# Patient Record
Sex: MALE | Race: WHITE | NOT HISPANIC OR LATINO | URBAN - METROPOLITAN AREA
[De-identification: names, ages, dates, MRNs, and addresses within clinical notes are randomized per-mention and may not be internally consistent; named-entity substitution may affect disease eponyms.]

---

## 2018-02-12 ENCOUNTER — INPATIENT (INPATIENT)
Facility: HOSPITAL | Age: 68
LOS: 11 days | Discharge: HOME | DRG: 133 | End: 2018-02-24
Attending: OTOLARYNGOLOGY | Admitting: OTOLARYNGOLOGY
Payer: MEDICARE

## 2018-02-12 ENCOUNTER — EMERGENCY (EMERGENCY)
Facility: HOSPITAL | Age: 68
LOS: 1 days | Discharge: HOME | End: 2018-02-12
Attending: EMERGENCY MEDICINE | Admitting: EMERGENCY MEDICINE
Payer: MEDICARE

## 2018-02-12 VITALS
DIASTOLIC BLOOD PRESSURE: 83 MMHG | HEART RATE: 83 BPM | RESPIRATION RATE: 18 BRPM | OXYGEN SATURATION: 98 % | SYSTOLIC BLOOD PRESSURE: 128 MMHG

## 2018-02-12 VITALS
TEMPERATURE: 99 F | RESPIRATION RATE: 16 BRPM | DIASTOLIC BLOOD PRESSURE: 84 MMHG | HEART RATE: 90 BPM | SYSTOLIC BLOOD PRESSURE: 158 MMHG | OXYGEN SATURATION: 97 %

## 2018-02-12 VITALS
TEMPERATURE: 97 F | RESPIRATION RATE: 17 BRPM | DIASTOLIC BLOOD PRESSURE: 70 MMHG | HEART RATE: 89 BPM | SYSTOLIC BLOOD PRESSURE: 103 MMHG | OXYGEN SATURATION: 100 % | WEIGHT: 184.97 LBS

## 2018-02-12 DIAGNOSIS — R68.84 JAW PAIN: ICD-10-CM

## 2018-02-12 DIAGNOSIS — F17.210 NICOTINE DEPENDENCE, CIGARETTES, UNCOMPLICATED: ICD-10-CM

## 2018-02-12 DIAGNOSIS — I10 ESSENTIAL (PRIMARY) HYPERTENSION: ICD-10-CM

## 2018-02-12 DIAGNOSIS — K12.2 CELLULITIS AND ABSCESS OF MOUTH: ICD-10-CM

## 2018-02-12 LAB
ALBUMIN SERPL ELPH-MCNC: 3.4 G/DL — SIGNIFICANT CHANGE UP (ref 3.4–5)
ALP SERPL-CCNC: 71 U/L — SIGNIFICANT CHANGE UP (ref 40–120)
ALT FLD-CCNC: 18 U/L — SIGNIFICANT CHANGE UP (ref 12–42)
AMYLASE P1 CFR SERPL: 50 U/L — SIGNIFICANT CHANGE UP (ref 25–115)
ANION GAP SERPL CALC-SCNC: 9 MMOL/L — SIGNIFICANT CHANGE UP (ref 9–16)
APTT BLD: 32 SEC — SIGNIFICANT CHANGE UP (ref 27.5–36.5)
AST SERPL-CCNC: 22 U/L — SIGNIFICANT CHANGE UP (ref 15–37)
BASOPHILS NFR BLD AUTO: 0.3 % — SIGNIFICANT CHANGE UP (ref 0–2)
BILIRUB SERPL-MCNC: 1.2 MG/DL — SIGNIFICANT CHANGE UP (ref 0.2–1.2)
BUN SERPL-MCNC: 24 MG/DL — HIGH (ref 7–23)
CALCIUM SERPL-MCNC: 10.2 MG/DL — SIGNIFICANT CHANGE UP (ref 8.5–10.5)
CHLORIDE SERPL-SCNC: 97 MMOL/L — SIGNIFICANT CHANGE UP (ref 96–108)
CO2 SERPL-SCNC: 28 MMOL/L — SIGNIFICANT CHANGE UP (ref 22–31)
CREAT SERPL-MCNC: 1.24 MG/DL — SIGNIFICANT CHANGE UP (ref 0.5–1.3)
CRP SERPL-MCNC: >12 MG/DL — HIGH (ref 0–0.9)
EOSINOPHIL NFR BLD AUTO: 0.1 % — SIGNIFICANT CHANGE UP (ref 0–6)
GLUCOSE SERPL-MCNC: 118 MG/DL — HIGH (ref 70–99)
HCT VFR BLD CALC: 44.9 % — SIGNIFICANT CHANGE UP (ref 39–50)
HGB BLD-MCNC: 15.5 G/DL — SIGNIFICANT CHANGE UP (ref 13–17)
IMM GRANULOCYTES NFR BLD AUTO: 0.9 % — SIGNIFICANT CHANGE UP (ref 0–1.5)
INR BLD: 1.21 — HIGH (ref 0.88–1.16)
LACTATE SERPL-SCNC: 1.1 MMOL/L — SIGNIFICANT CHANGE UP (ref 0.4–2)
LYMPHOCYTES # BLD AUTO: 4.1 % — LOW (ref 13–44)
MCHC RBC-ENTMCNC: 32.9 PG — SIGNIFICANT CHANGE UP (ref 27–34)
MCHC RBC-ENTMCNC: 34.5 G/DL — SIGNIFICANT CHANGE UP (ref 32–36)
MCV RBC AUTO: 95.3 FL — SIGNIFICANT CHANGE UP (ref 80–100)
MONOCYTES NFR BLD AUTO: 9.2 % — SIGNIFICANT CHANGE UP (ref 2–14)
NEUTROPHILS NFR BLD AUTO: 85.4 % — HIGH (ref 43–77)
PLATELET # BLD AUTO: 236 K/UL — SIGNIFICANT CHANGE UP (ref 150–400)
POTASSIUM SERPL-MCNC: 3.3 MMOL/L — LOW (ref 3.5–5.3)
POTASSIUM SERPL-SCNC: 3.3 MMOL/L — LOW (ref 3.5–5.3)
PROT SERPL-MCNC: 8.8 G/DL — HIGH (ref 6.4–8.2)
PROTHROM AB SERPL-ACNC: 13.4 SEC — HIGH (ref 9.8–12.7)
RBC # BLD: 4.71 M/UL — SIGNIFICANT CHANGE UP (ref 4.2–5.8)
RBC # FLD: 12.3 % — SIGNIFICANT CHANGE UP (ref 10.3–16.9)
SODIUM SERPL-SCNC: 134 MMOL/L — SIGNIFICANT CHANGE UP (ref 132–145)
WBC # BLD: 26.3 K/UL — HIGH (ref 3.8–10.5)
WBC # FLD AUTO: 26.3 K/UL — HIGH (ref 3.8–10.5)

## 2018-02-12 PROCEDURE — 99285 EMERGENCY DEPT VISIT HI MDM: CPT

## 2018-02-12 PROCEDURE — 93010 ELECTROCARDIOGRAM REPORT: CPT

## 2018-02-12 PROCEDURE — 99285 EMERGENCY DEPT VISIT HI MDM: CPT | Mod: 25

## 2018-02-12 PROCEDURE — 99222 1ST HOSP IP/OBS MODERATE 55: CPT

## 2018-02-12 PROCEDURE — 71045 X-RAY EXAM CHEST 1 VIEW: CPT | Mod: 26

## 2018-02-12 PROCEDURE — 70491 CT SOFT TISSUE NECK W/DYE: CPT | Mod: 26

## 2018-02-12 PROCEDURE — 99232 SBSQ HOSP IP/OBS MODERATE 35: CPT

## 2018-02-12 RX ORDER — ACETAMINOPHEN 500 MG
650 TABLET ORAL EVERY 6 HOURS
Qty: 0 | Refills: 0 | Status: DISCONTINUED | OUTPATIENT
Start: 2018-02-12 | End: 2018-02-21

## 2018-02-12 RX ORDER — INFLUENZA VIRUS VACCINE 15; 15; 15; 15 UG/.5ML; UG/.5ML; UG/.5ML; UG/.5ML
0.5 SUSPENSION INTRAMUSCULAR ONCE
Qty: 0 | Refills: 0 | Status: COMPLETED | OUTPATIENT
Start: 2018-02-12 | End: 2018-02-12

## 2018-02-12 RX ORDER — ONDANSETRON 8 MG/1
4 TABLET, FILM COATED ORAL EVERY 6 HOURS
Qty: 0 | Refills: 0 | Status: DISCONTINUED | OUTPATIENT
Start: 2018-02-12 | End: 2018-02-21

## 2018-02-12 RX ORDER — DEXAMETHASONE 0.5 MG/5ML
10 ELIXIR ORAL ONCE
Qty: 0 | Refills: 0 | Status: COMPLETED | OUTPATIENT
Start: 2018-02-12 | End: 2018-02-12

## 2018-02-12 RX ORDER — VANCOMYCIN HCL 1 G
1500 VIAL (EA) INTRAVENOUS ONCE
Qty: 0 | Refills: 0 | Status: COMPLETED | OUTPATIENT
Start: 2018-02-12 | End: 2018-02-12

## 2018-02-12 RX ORDER — AMPICILLIN SODIUM AND SULBACTAM SODIUM 250; 125 MG/ML; MG/ML
3 INJECTION, POWDER, FOR SUSPENSION INTRAMUSCULAR; INTRAVENOUS EVERY 6 HOURS
Qty: 0 | Refills: 0 | Status: DISCONTINUED | OUTPATIENT
Start: 2018-02-12 | End: 2018-02-12

## 2018-02-12 RX ORDER — CHLORHEXIDINE GLUCONATE 213 G/1000ML
15 SOLUTION TOPICAL THREE TIMES A DAY
Qty: 0 | Refills: 0 | Status: DISCONTINUED | OUTPATIENT
Start: 2018-02-12 | End: 2018-02-21

## 2018-02-12 RX ORDER — AMPICILLIN SODIUM AND SULBACTAM SODIUM 250; 125 MG/ML; MG/ML
1.5 INJECTION, POWDER, FOR SUSPENSION INTRAMUSCULAR; INTRAVENOUS EVERY 6 HOURS
Qty: 0 | Refills: 0 | Status: DISCONTINUED | OUTPATIENT
Start: 2018-02-12 | End: 2018-02-14

## 2018-02-12 RX ORDER — IBUPROFEN 200 MG
400 TABLET ORAL EVERY 6 HOURS
Qty: 0 | Refills: 0 | Status: DISCONTINUED | OUTPATIENT
Start: 2018-02-12 | End: 2018-02-14

## 2018-02-12 RX ORDER — SODIUM CHLORIDE 9 MG/ML
1000 INJECTION INTRAMUSCULAR; INTRAVENOUS; SUBCUTANEOUS ONCE
Qty: 0 | Refills: 0 | Status: COMPLETED | OUTPATIENT
Start: 2018-02-12 | End: 2018-02-12

## 2018-02-12 RX ORDER — AMPICILLIN SODIUM AND SULBACTAM SODIUM 250; 125 MG/ML; MG/ML
1.5 INJECTION, POWDER, FOR SUSPENSION INTRAMUSCULAR; INTRAVENOUS ONCE
Qty: 0 | Refills: 0 | Status: COMPLETED | OUTPATIENT
Start: 2018-02-12 | End: 2018-02-12

## 2018-02-12 RX ORDER — SODIUM CHLORIDE 9 MG/ML
1000 INJECTION, SOLUTION INTRAVENOUS
Qty: 0 | Refills: 0 | Status: DISCONTINUED | OUTPATIENT
Start: 2018-02-12 | End: 2018-02-16

## 2018-02-12 RX ORDER — OXYCODONE AND ACETAMINOPHEN 5; 325 MG/1; MG/1
1 TABLET ORAL EVERY 4 HOURS
Qty: 0 | Refills: 0 | Status: DISCONTINUED | OUTPATIENT
Start: 2018-02-12 | End: 2018-02-14

## 2018-02-12 RX ADMIN — Medication 100 MILLIGRAM(S): at 12:18

## 2018-02-12 RX ADMIN — CHLORHEXIDINE GLUCONATE 15 MILLILITER(S): 213 SOLUTION TOPICAL at 22:36

## 2018-02-12 RX ADMIN — Medication 300 MILLIGRAM(S): at 13:47

## 2018-02-12 RX ADMIN — SODIUM CHLORIDE 1000 MILLILITER(S): 9 INJECTION INTRAMUSCULAR; INTRAVENOUS; SUBCUTANEOUS at 19:34

## 2018-02-12 RX ADMIN — Medication 10 MILLIGRAM(S): at 12:18

## 2018-02-12 RX ADMIN — AMPICILLIN SODIUM AND SULBACTAM SODIUM 100 GRAM(S): 250; 125 INJECTION, POWDER, FOR SUSPENSION INTRAMUSCULAR; INTRAVENOUS at 19:34

## 2018-02-12 RX ADMIN — SODIUM CHLORIDE 1000 MILLILITER(S): 9 INJECTION, SOLUTION INTRAVENOUS at 13:11

## 2018-02-12 NOTE — ED PROVIDER NOTE - NS ED ROS FT
Gen - no fever, chills, malaise, weight loss, generalized weakness   Skin - no rash, pruritus, flushing   HEENT - no HA, change in vision/hearing, tinnitus, neck pain, stiffness. +throat pain, +hoarseness  CV - no CP, palpitations, diaphoresis, orthopnea  Resp - no SOB, wheezing, cough, hemoptysis   GI - no N/V/D/C, abdominal pain, bloating, hematochezia, melena   - no dysuria, hematuria, flank pain, change in urinary/bowel function   MS - no stiffness, weakness, arthralgia, or myalgia   Hem/Onc - no abnormal bleeding or bruising   Neuro - no dizziness, numbness, tingling, paresthesia, focal weakness, diplopia, or gait disturbance

## 2018-02-12 NOTE — H&P ADULT - ATTENDING COMMENTS
Mr. Marcum has left neck abscess and high white count.  Pus aspirated from abscess pocket.  No airway or swallowing compromise  Pt examined in ED,  and Ct was reviewed.  Agree with plan for IV antibiotics and reaspiration next morning.

## 2018-02-12 NOTE — ED ADULT TRIAGE NOTE - OTHER COMPLAINTS
Patient noted to have swelling to chin and neck. Patient transferred from [facility] for r/o Juan's angina. Speaking in full sentences.

## 2018-02-12 NOTE — ED PROVIDER NOTE - ATTENDING CONTRIBUTION TO CARE
66 yo male with three days of worsening neck pain.  HTN hx on lisinopril (no evidence of angioedema).  On clinical exam - non toxic, no drooling, slightly hoarse voice.  oropharynx normal.  Firm tender submental area with surround erythema.  No trismus.  Likely Juan Angina.  no hypoxia or tachypnea.  Blood work and imaging reviewed.  Will need ENT evaluation at [facility] - ED to ED transfer.  Patient continues to appear non toxic.  Consented for transfer

## 2018-02-12 NOTE — ED PROVIDER NOTE - PHYSICAL EXAMINATION
gen: no acute distress, comfortable, conversant  HEENT: oropharynx clear no exudate, no uvular deviation, neck fullness anterior bl, erythema, + brawny edema, no trismus, no drooling, no resp distress, + hoarse voice  Neck: supple, no meningismus, no bruit noted bl carotid speaking in full sentences  CV: rrr no m/r/g 2+ radial pulse  Resp: ctab, no w/c/r  Abd: nontender, no rebound/guarding  Ext: no edema, pedal pulses 2+  Neuro: alert and oriented, cn grossly intact, strength equal in all 4 ext,

## 2018-02-12 NOTE — ED PROVIDER NOTE - OBJECTIVE STATEMENT
67M with glaucoma, HTN presenting from [facility] with submandibular abscesses, neck swelling. + hoarseness, no trismus currently although pt reported trismus before. No neck stiffness, no fever, no drooling, able to chuck PO fluids.

## 2018-02-12 NOTE — ED PROVIDER NOTE - MEDICAL DECISION MAKING DETAILS
68M with submandibular abscesses, concern for Juan's angina but vital signs wnl, no drooling, no resp distress, no trismus, airway is intact however pt's voice is hoarse. Given IV abx, wbc count elevated, appreciate ENT consult. Plan for I&D and will admit to ENT.

## 2018-02-12 NOTE — ED PROVIDER NOTE - DIAGNOSTIC INTERPRETATION
Xray (wet reads) interpreted by DONALD AGUSTIN   CXR - Cardiac silhouette, aortic knob, mediastinal and hilar contours appear wnl, no acute consolidation, infiltrate, effusion, or PTX. No bony abnormalities noted

## 2018-02-12 NOTE — ED PROVIDER NOTE - MEDICAL DECISION MAKING DETAILS
pt with 3d of jaw and neck pain, woke up with moderate swelling and redness to the region, afebrile, no recent dental procedure noted, no changes in meds, airway patent and tolerating po, sx suggestive of geo's syndrome, labs and CT performed, leukocytosis to 26K, markedly elevated CRP, CT with loculated collections b/l, L>R, ENT fellow notified and case discussed with Dr. Cortez, rec transfer to [facility] ER for further eval and tx, sign out provided to [facility] PIC Dr. Byrne

## 2018-02-12 NOTE — ED ADULT NURSE NOTE - OBJECTIVE STATEMENT
Pt brought Pt brought to ED as transfer from [facility] to rule out Juan's angina. Pt with chin/neck swelling x3 days, pt reports improvement of swelling s/p receiving meds at [facility]. Voice changes noted, pt denies difficulty breathing, no SOB noted. Pt received 600mg clindamycin, 1500mg vancomycin and 10mg decadron. Denies allergies, recent travel, fever/chills, cough/CP/nvd. VSS. Will reassess.

## 2018-02-12 NOTE — ED ADULT TRIAGE NOTE - CHIEF COMPLAINT QUOTE
here for swelling to throat x 3 days- unable to visualize uvula- c/o difficulty swallowing and hoarseness to voice

## 2018-02-12 NOTE — ED ADULT NURSE NOTE - OBJECTIVE STATEMENT
Pt c/o upper neck pain x3days with swelling and redness that began today. Pt reports some difficulty swallowing and throat pain, denies any difficulty breathing. No cough, no CP/SOB, no fever/chills, no N/V/D. Pt was clinically upgraded, placed on droplet precautions to r/o mumps.

## 2018-02-12 NOTE — ED PROVIDER NOTE - PHYSICAL EXAMINATION
Gen - NAD, comfortable in stretcher, non-toxic appearing, speaking in full sentences   Skin - warm, dry, intact  HEENT - AT/NC, PERRL, EOMI, no conjunctival injection, o/p clear with no erythema, edema, or exudate, uvula midline, airway patent. Moderate amount of erythema, edema, starting from the base of submandibular region bilaterally extending to anterior neck and chest wall with slight palpable firmness and tenderness. No fluctuance, trismus, or drooling. Uvula midline. No weeping.   CV - S1S2, R/R/R  Resp - respiration non-labored, CTAB, symmetric bs b/l, no r/r/w  GI - NABS, soft, ND, NT, no rebound or guarding, no CVAT b/l   MS - w/w/p, no c/c/e, calves supple and NT, distal pulses symmetric b/l   Neuro - AxOx3, no focal neuro deficits, CN II-XII grossly intact, cerebellar function intact, ambulatory without gait disturbance Gen - WDWN M, NAD, comfortable in stretcher, non-toxic appearing, speaking in full sentences   Skin - warm, dry, intact  HEENT - AT/NC, PERRL, EOMI, no conjunctival injection, o/p clear with no erythema, edema, or exudate, uvula midline, airway patent. Moderate amount of erythema, edema, starting from the base of submandibular region bilaterally extending to anterior neck and chest wall with slight palpable firmness and tenderness. No fluctuance, trismus, or drooling. Uvula midline. No weeping.   CV - S1S2, R/R/R  Resp - respiration non-labored, CTAB, symmetric bs b/l, no r/r/w  GI - NABS, soft, ND, NT, no rebound or guarding, no CVAT b/l   MS - w/w/p, no c/c/e, calves supple and NT, distal pulses symmetric b/l   Neuro - AxOx3, no focal neuro deficits, CN II-XII grossly intact, cerebellar function intact, ambulatory without gait disturbance

## 2018-02-12 NOTE — ED ADULT NURSE NOTE - OTHER COMPLAINTS
Patient noted to have swelling to chin and neck. Patient transferred from [facility] for r/o Juan's angina. Speaking in full sentences.

## 2018-02-12 NOTE — H&P ADULT - HISTORY OF PRESENT ILLNESS
[facility] H&P    HPI: 67yoM PMHx HTN and glaucoma p/w several days of worsening L>R submanibular/submental swelling and erythema. First noticed swelling which has gotten progressively worse with new onset voice change today which prompted him to go to ED. Patient denies inciting event, trauma or injury or defect in skin. Denies recent URI or viral sx. Denies hx sialadenitis or sialolithiasis. Denies hx DM, immunosuppression. CT neck in [facility] ED showing intramuscular anterior/posterior belly of digastric abscess so patient transferred to [facility] ED for further evaluation. Denies dysphagia, odynophagia, dyspnea, f/c/s, n/v. Tolerating secretions.     Allergies  No Known Allergies  Intolerances        PAST MEDICAL & SURGICAL HISTORY:  Glaucoma  HTN (hypertension)  No significant past surgical history      SOCIAL HISTORY:  Tobacco History: current smoker, 4cig/day (previous PPD smoker)  ETOH Use: social alcohol 2x/week      REVIEW OF SYSTEMS: as per HPI    Endocrine:	      MEDICATIONS:  Antiinfectives:       Hematologic/Anticoagulation:      Pain medications/Neuro:      IV fluids:      Endocrine Medications:       All other standing medications:       All other PRN medications:      Vital Signs Last 24 Hrs  T(C): 37.2 (12 Feb 2018 18:00), Max: 37.4 (12 Feb 2018 16:02)  T(F): 99 (12 Feb 2018 18:00), Max: 99.4 (12 Feb 2018 16:02)  HR: 87 (12 Feb 2018 18:00) (70 - 90)  BP: 111/75 (12 Feb 2018 18:00) (103/70 - 158/84)  BP(mean): --  RR: 17 (12 Feb 2018 18:00) (16 - 18)  SpO2: 97% (12 Feb 2018 18:00) (97% - 100%)    LABS:  CBC-    02-12    134  |  97  |  24<H>  ----------------------------<  118<H>  3.3<L>   |  28  |  1.24    Ca    10.2      12 Feb 2018 12:36    TPro  8.8<H>  /  Alb  3.4  /  TBili  1.2  /  DBili  x   /  AST  22  /  ALT  18  /  AlkPhos  71  02-12    Coagulation Studies-  PT/INR - ( 12 Feb 2018 12:36 )   PT: 13.4 sec;   INR: 1.21          PTT - ( 12 Feb 2018 12:36 )  PTT:32.0 sec  Endocrine Panel-  --  --  10.2 mg/dL        PHYSICAL EXAM:    ENT EXAM-   Constitutional: Well-developed, well-nourished. Nontoxic appearing   voice slightly muffled   Head:  normocephalic, atraumatic.   OC/OP:  Tonsils present 1+, Floor of mouth, buccal mucosa, lips, hard palate, soft palate, uvula, posterior pharyngeal wall normal.  Mucosa moist. no mucosal lesions  saliva from b/l elkin's ducts, no induration or fluctuance on bimanual palpation, no edema   Neck with L>R swelling and induration with erythema, moderate ttp   Lymph:  No palpable cervical adenopathy, L 1cm soft mobile periparotid lymph node at angle of mandible     MULTISYSTEM EXAM-  Neuro/Psych:  A&O x 3.  Mood stable.  Affect bright.  Cranial nerves: 2-12 grossly intact bilaterally.  Eyes:  EOMI, no nystagmus.  Pulm:  No dyspnea, non-labored breathing, tolerating secretions     LARYNGOSCOPY EXAM:     -Verbal consent was obtained from patient prior to procedure.  Indication: hoarseness   Anesthesia: Afrin spray was applied to the nasal cavities.    Flexible laryngoscopy was performed and revealed the following:    -- Nasopharynx had no mass or exudate.    -- Base of tongue was symmetric and not enlarged.    -- Vallecula was clear    -- Left Lingual surface of epiglottis with mild watery edema extending down left AE fold and arytenoid, minimal involvement of false cords    -- True vocal folds uninvolved, were fully mobile and without lesions.     -- Post cricoid area was clear. piriforms were clear    -- Interarytenoid edema was absent.      -- Airway widely patent     The patient tolerated the procedure well.      RADIOLOGY & ADDITIONAL STUDIES:  CT neck w/ IV contrast: Findings:  There is central fluid density within the expanded left digastric   anterior and posterior bellies, extending from the left submental region   to the lateral skull base. There is surrounding inflammation with   reticulation of adjacent fat planes as well as skin and platysma   thickening. Findings are compatible with intramuscular abscess formation.   Etiology is not certain, as there is no obvious site of active   odontogenic infection and mild enlargement of the left submandibular   gland is felt to more likely reflect secondary sialoadenitis.  *  Aerodigestive Tract: Edema extends into the left parapharyngeal space   with presumed reactive epiglottic and left aryepiglottic fold thickening   and infiltration of the preepiglottic fat. There is no evidence of airway   compromise.  *  Lymph Nodes: Mild reactive appearing lymph node enlargement is noted   in left greater than right levels 1 through 3.  *  Salivary Glands: Asymmetric enlargement and enhancement of the left   submandibular gland without intraglandular calculus or calculus along the   expected course of left Warthin's duct. Findings are felt more likely to   reflect reactive sialoadenitis then primary source of infection.  *  Thyroid Gland: Subcentimeter right lobe cyst.  *  Orbits: Native ocular lenses are absent bilaterally. Orbital contents   are otherwise normal.  *  Brain: Visualized portions are grossly normal.  *  Skull Base: Intact.  *  Paranasal Sinuses: Scattered foci of mucosal thickening and polypoid   soft tissue.  *  Mastoids: Clear bilaterally.  *  Cervical Spine: Normal vertebral height and alignment with minor   degenerative change.  *  Lung Apices: No large apical lung mass.    IMPRESSION:  Intramuscular abscess involving both anterior and posterior bellies of   the left digastric muscle, as above. No obvious odontogenic or salivary   source is identified on this CT study.      Procedure: All r/b/a discussed and verbal consent obtained. 2cc lidocaine 2% with 1:100,000 epinephrine infiltrated into subcutaneous tissue for local anesthesia. Alcohol prep pad used to clean skin. 18G needle used to aspirate ~1.5cc of purulent material. Culture sent. Left periparotid lymph node aspirated without any purulent material. patient tolerated procedure well.       Assessment/Plan:  67y Male with left soft tissue vs. intramuscular abscess s/p needle aspiration 2/12    -Admit to ENT  -Unasyn for abx tx  -f/u culture  -regular diet  -restart home meds  -warm compresses   -nicotine patch   -up ad mary  -IS  -DVT ppx: SCDs, ambulation'  -[facility] at [phone] with any questions/concerns.    Dispo: regional room    seen with attending who agrees with plan above

## 2018-02-12 NOTE — ED PROVIDER NOTE - OBJECTIVE STATEMENT
67 year old male w/ PMH HTN on lisinopril for over 5 years, NKDA, smokes 4 cigarettes daily, who presents to the ED w/ 3 days of pain in bilateral jaws, gradual onset, progressively worsening waking up this morning with swelling to bilateral jaw, hoarseness and change in voice, so patient presents for evaluation. No recent procedure or dental work done. No change in medications. No new products/medications. Denies fever, chills, drooling, stridors, change in phonation, SOB, wheezing, cough, congestion, HA, dizziness, N/V/D/C, abdominal pain, rash, neck pain, focal weakness, numbness, tingling, and malaise 67 year old male w/ PMH HTN on lisinopril for over 5 years, NKDA, smokes 4 cigarettes daily, who presents to the ED w/ 3 days of pain in bilateral jaws, gradual onset, progressively worsening waking up this morning with swelling to bilateral jaw, hoarseness and change in voice, so patient presents for evaluation. No recent procedure or dental work done. No change in medications. No new products/medications. Denies fever, chills, drooling, stridors, change in phonation, SOB, wheezing, cough, congestion, HA, dizziness, N/V/D/C, abdominal pain, rash, neck pain, focal weakness, numbness, tingling, and malaise No h/o DM or HIV. 67 year old male w/ PMH HTN on lisinopril for over 5 years, NKDA, smokes 4 cigarettes daily, who presents to the ED w/ 3 days of pain in bilateral jaws, gradual onset, progressively worsening waking up this morning with swelling to bilateral jaw, hoarseness and change in voice, so patient presents for evaluation. No recent procedure or dental work done. No change in medications. No new products/medications. Denies fever, chills, drooling, stridors, change in phonation, SOB, wheezing, cough, congestion, HA, dizziness, N/V/D/C, abdominal pain, rash, neck pain, focal weakness, numbness, tingling, and malaise No h/o DM or HIV

## 2018-02-13 LAB
ANION GAP SERPL CALC-SCNC: 16 MMOL/L — SIGNIFICANT CHANGE UP (ref 5–17)
BUN SERPL-MCNC: 16 MG/DL — SIGNIFICANT CHANGE UP (ref 7–23)
CALCIUM SERPL-MCNC: 9.2 MG/DL — SIGNIFICANT CHANGE UP (ref 8.4–10.5)
CHLORIDE SERPL-SCNC: 96 MMOL/L — SIGNIFICANT CHANGE UP (ref 96–108)
CO2 SERPL-SCNC: 26 MMOL/L — SIGNIFICANT CHANGE UP (ref 22–31)
CREAT SERPL-MCNC: 0.73 MG/DL — SIGNIFICANT CHANGE UP (ref 0.5–1.3)
GLUCOSE SERPL-MCNC: 146 MG/DL — HIGH (ref 70–99)
GRAM STN FLD: SIGNIFICANT CHANGE UP
HCT VFR BLD CALC: 38 % — LOW (ref 39–50)
HGB BLD-MCNC: 13.3 G/DL — SIGNIFICANT CHANGE UP (ref 13–17)
MAGNESIUM SERPL-MCNC: 1.8 MG/DL — SIGNIFICANT CHANGE UP (ref 1.6–2.6)
MCHC RBC-ENTMCNC: 32.5 PG — SIGNIFICANT CHANGE UP (ref 27–34)
MCHC RBC-ENTMCNC: 35 G/DL — SIGNIFICANT CHANGE UP (ref 32–36)
MCV RBC AUTO: 92.9 FL — SIGNIFICANT CHANGE UP (ref 80–100)
PHOSPHATE SERPL-MCNC: 3.1 MG/DL — SIGNIFICANT CHANGE UP (ref 2.5–4.5)
PLATELET # BLD AUTO: 216 K/UL — SIGNIFICANT CHANGE UP (ref 150–400)
POTASSIUM SERPL-MCNC: 3.5 MMOL/L — SIGNIFICANT CHANGE UP (ref 3.5–5.3)
POTASSIUM SERPL-SCNC: 3.5 MMOL/L — SIGNIFICANT CHANGE UP (ref 3.5–5.3)
RBC # BLD: 4.09 M/UL — LOW (ref 4.2–5.8)
RBC # FLD: 12.7 % — SIGNIFICANT CHANGE UP (ref 10.3–16.9)
SODIUM SERPL-SCNC: 138 MMOL/L — SIGNIFICANT CHANGE UP (ref 135–145)
SPECIMEN SOURCE: SIGNIFICANT CHANGE UP
WBC # BLD: 21.5 K/UL — HIGH (ref 3.8–10.5)
WBC # FLD AUTO: 21.5 K/UL — HIGH (ref 3.8–10.5)

## 2018-02-13 PROCEDURE — 99231 SBSQ HOSP IP/OBS SF/LOW 25: CPT

## 2018-02-13 RX ORDER — LISINOPRIL 2.5 MG/1
10 TABLET ORAL DAILY
Qty: 0 | Refills: 0 | Status: DISCONTINUED | OUTPATIENT
Start: 2018-02-13 | End: 2018-02-21

## 2018-02-13 RX ORDER — NICOTINE POLACRILEX 2 MG
1 GUM BUCCAL DAILY
Qty: 0 | Refills: 0 | Status: DISCONTINUED | OUTPATIENT
Start: 2018-02-13 | End: 2018-02-21

## 2018-02-13 RX ORDER — HYDROCHLOROTHIAZIDE 25 MG
25 TABLET ORAL ONCE
Qty: 0 | Refills: 0 | Status: COMPLETED | OUTPATIENT
Start: 2018-02-13 | End: 2018-02-13

## 2018-02-13 RX ORDER — MAGNESIUM OXIDE 400 MG ORAL TABLET 241.3 MG
400 TABLET ORAL ONCE
Qty: 0 | Refills: 0 | Status: COMPLETED | OUTPATIENT
Start: 2018-02-13 | End: 2018-02-13

## 2018-02-13 RX ORDER — HEPARIN SODIUM 5000 [USP'U]/ML
5000 INJECTION INTRAVENOUS; SUBCUTANEOUS EVERY 12 HOURS
Qty: 0 | Refills: 0 | Status: DISCONTINUED | OUTPATIENT
Start: 2018-02-13 | End: 2018-02-21

## 2018-02-13 RX ORDER — LISINOPRIL 2.5 MG/1
10 TABLET ORAL ONCE
Qty: 0 | Refills: 0 | Status: COMPLETED | OUTPATIENT
Start: 2018-02-13 | End: 2018-02-13

## 2018-02-13 RX ORDER — POTASSIUM CHLORIDE 20 MEQ
40 PACKET (EA) ORAL ONCE
Qty: 0 | Refills: 0 | Status: COMPLETED | OUTPATIENT
Start: 2018-02-13 | End: 2018-02-13

## 2018-02-13 RX ORDER — HYDROCHLOROTHIAZIDE 25 MG
25 TABLET ORAL DAILY
Qty: 0 | Refills: 0 | Status: DISCONTINUED | OUTPATIENT
Start: 2018-02-13 | End: 2018-02-21

## 2018-02-13 RX ORDER — TIMOLOL 0.5 %
1 DROPS OPHTHALMIC (EYE)
Qty: 0 | Refills: 0 | Status: DISCONTINUED | OUTPATIENT
Start: 2018-02-13 | End: 2018-02-21

## 2018-02-13 RX ADMIN — HEPARIN SODIUM 5000 UNIT(S): 5000 INJECTION INTRAVENOUS; SUBCUTANEOUS at 17:39

## 2018-02-13 RX ADMIN — AMPICILLIN SODIUM AND SULBACTAM SODIUM 100 GRAM(S): 250; 125 INJECTION, POWDER, FOR SUSPENSION INTRAMUSCULAR; INTRAVENOUS at 15:43

## 2018-02-13 RX ADMIN — CHLORHEXIDINE GLUCONATE 15 MILLILITER(S): 213 SOLUTION TOPICAL at 15:43

## 2018-02-13 RX ADMIN — Medication 25 MILLIGRAM(S): at 18:45

## 2018-02-13 RX ADMIN — AMPICILLIN SODIUM AND SULBACTAM SODIUM 100 GRAM(S): 250; 125 INJECTION, POWDER, FOR SUSPENSION INTRAMUSCULAR; INTRAVENOUS at 06:54

## 2018-02-13 RX ADMIN — CHLORHEXIDINE GLUCONATE 15 MILLILITER(S): 213 SOLUTION TOPICAL at 21:44

## 2018-02-13 RX ADMIN — CHLORHEXIDINE GLUCONATE 15 MILLILITER(S): 213 SOLUTION TOPICAL at 06:54

## 2018-02-13 RX ADMIN — AMPICILLIN SODIUM AND SULBACTAM SODIUM 100 GRAM(S): 250; 125 INJECTION, POWDER, FOR SUSPENSION INTRAMUSCULAR; INTRAVENOUS at 21:45

## 2018-02-13 RX ADMIN — AMPICILLIN SODIUM AND SULBACTAM SODIUM 100 GRAM(S): 250; 125 INJECTION, POWDER, FOR SUSPENSION INTRAMUSCULAR; INTRAVENOUS at 01:05

## 2018-02-13 RX ADMIN — MAGNESIUM OXIDE 400 MG ORAL TABLET 400 MILLIGRAM(S): 241.3 TABLET ORAL at 15:44

## 2018-02-13 RX ADMIN — Medication 400 MILLIGRAM(S): at 17:23

## 2018-02-13 RX ADMIN — Medication 400 MILLIGRAM(S): at 15:44

## 2018-02-13 RX ADMIN — Medication 40 MILLIEQUIVALENT(S): at 15:44

## 2018-02-13 RX ADMIN — LISINOPRIL 10 MILLIGRAM(S): 2.5 TABLET ORAL at 18:45

## 2018-02-13 NOTE — PROGRESS NOTE ADULT - SUBJECTIVE AND OBJECTIVE BOX
ENT [facility] DAILY PROGRESS NOTE    Overnight events/Interval HPI: 67y Male          Allergies    No Known Allergies    Intolerances        MEDICATIONS:  Antiinfectives:   ampicillin/sulbactam  IVPB 1.5 Gram(s) IV Intermittent every 6 hours    IV fluids:  magnesium oxide 400 milliGRAM(s) Oral once  potassium chloride    Tablet ER 40 milliEquivalent(s) Oral once    Hematologic/Anticoagulation:  heparin  Injectable 5000 Unit(s) SubCutaneous every 12 hours    Pain medications/Neuro:  acetaminophen   Tablet. 650 milliGRAM(s) Oral every 6 hours PRN  ibuprofen  Tablet 400 milliGRAM(s) Oral every 6 hours PRN  ondansetron Injectable 4 milliGRAM(s) IV Push every 6 hours PRN  oxyCODONE    5 mG/acetaminophen 325 mG 1 Tablet(s) Oral every 4 hours PRN    Endocrine Medications:     All other standing medications:   chlorhexidine 0.12% Liquid 15 milliLiter(s) Swish and Spit three times a day  influenza   Vaccine 0.5 milliLiter(s) IntraMuscular once    All other PRN medications:      Vital Signs Last 24 Hrs  T(C): 36.6 (13 Feb 2018 05:26), Max: 37.4 (12 Feb 2018 16:02)  T(F): 97.8 (13 Feb 2018 05:26), Max: 99.4 (12 Feb 2018 16:02)  HR: 69 (13 Feb 2018 05:26) (69 - 90)  BP: 117/79 (13 Feb 2018 05:26) (103/70 - 158/84)  BP(mean): --  RR: 16 (13 Feb 2018 05:26) (16 - 18)  SpO2: 98% (13 Feb 2018 05:26) (97% - 100%)      02-12 @ 07:01  -  02-13 @ 07:00  --------------------------------------------------------  IN:    Oral Fluid: 120 mL  Total IN: 120 mL    OUT:  Total OUT: 0 mL    Total NET: 120 mL            PHYSICAL EXAM:    ENT EXAM-   Constitutional: Well-developed, well-nourished.  No hoarseness.     Head:  normocephalic, atraumatic.   Ears:  Ear canals both clear.  Tympanic membranes both intact; no effusion or retraction.  Nose:  Septum intact, midline, deviated.  Inferior turbinates normal bilateral  OC/OP:  Tonsils present /  absent.  Floor of mouth, buccal mucosa, lips, hard palate, soft palate, uvula, posterior pharyngeal wall normal.  Mucosa moist.  Neck:  Trachea midline.  Thyroid, parotid and submandibular glands normal.  Lymph:  No cervical adenopathy.  Facial Plastics:     MULTISYSTEM EXAM-  Neuro/Psych:  A&O x 3.  Mood stable.  Affect bright.  Cranial nerves: 2-12 grossly intact bilaterally.  Eyes:  EOMI, no nystagmus.  Pulm:  No dyspnea, non-labored breathing  Cardiovascular: Carotid pulses 2+ bilaterally.  No periphreal edema.  Skin:  No rash or lesions on exposed skin of head/neck    LABS:  CBC-                        13.3   21.5  )-----------( 216      ( 13 Feb 2018 05:44 )             38.0     BMP/CMP-  13 Feb 2018 05:44    138    |  96     |  16     ----------------------------<  146    3.5     |  26     |  0.73     Ca    9.2        13 Feb 2018 05:44  Phos  3.1       13 Feb 2018 05:44  Mg     1.8       13 Feb 2018 05:44    TPro  8.8    /  Alb  3.4    /  TBili  1.2    /  DBili  x      /  AST  22     /  ALT  18     /  AlkPhos  71     12 Feb 2018 12:36    Coagulation Studies-  PT/INR - ( 12 Feb 2018 12:36 )   PT: 13.4 sec;   INR: 1.21          PTT - ( 12 Feb 2018 12:36 )  PTT:32.0 sec  Endocrine Panel-  Calcium, Total Serum: 9.2 mg/dL (02-13 @ 05:44)  Calcium, Total Serum: 10.2 mg/dL (02-12 @ 12:36)        Assessment/Plan:  67y Male ENT [facility] DAILY PROGRESS NOTE    Overnight events/Interval:  CAMPBELL overnight. AFVSS. Tolerating PO. Pain well controlled. Exam stable. Denies dyspnea, CP, dysphagia, n/v, f/c/s. On unasyn for abx tx. WBC downtrending.       Allergies  No Known Allergies  Intolerances      MEDICATIONS:  Antiinfectives:   ampicillin/sulbactam  IVPB 1.5 Gram(s) IV Intermittent every 6 hours    IV fluids:  magnesium oxide 400 milliGRAM(s) Oral once  potassium chloride    Tablet ER 40 milliEquivalent(s) Oral once    Hematologic/Anticoagulation:  heparin  Injectable 5000 Unit(s) SubCutaneous every 12 hours    Pain medications/Neuro:  acetaminophen   Tablet. 650 milliGRAM(s) Oral every 6 hours PRN  ibuprofen  Tablet 400 milliGRAM(s) Oral every 6 hours PRN  ondansetron Injectable 4 milliGRAM(s) IV Push every 6 hours PRN  oxyCODONE    5 mG/acetaminophen 325 mG 1 Tablet(s) Oral every 4 hours PRN    Endocrine Medications:     All other standing medications:   chlorhexidine 0.12% Liquid 15 milliLiter(s) Swish and Spit three times a day  influenza   Vaccine 0.5 milliLiter(s) IntraMuscular once    All other PRN medications:      Vital Signs Last 24 Hrs  T(C): 36.6 (13 Feb 2018 05:26), Max: 37.4 (12 Feb 2018 16:02)  T(F): 97.8 (13 Feb 2018 05:26), Max: 99.4 (12 Feb 2018 16:02)  HR: 69 (13 Feb 2018 05:26) (69 - 90)  BP: 117/79 (13 Feb 2018 05:26) (103/70 - 158/84)  BP(mean): --  RR: 16 (13 Feb 2018 05:26) (16 - 18)  SpO2: 98% (13 Feb 2018 05:26) (97% - 100%)      02-12 @ 07:01  -  02-13 @ 07:00  --------------------------------------------------------  IN:    Oral Fluid: 120 mL  Total IN: 120 mL    OUT:  Total OUT: 0 mL    Total NET: 120 mL            PHYSICAL EXAM:  NAD, alert and appropriate, pleasant  NC/AT, EOMI  nonlabored respirations on RA, no stridor/stertor, tolerating secretions  voice slightly muffled but clear  OC/Op poor dentition, saliva from Reggie's and Gentry's duct, no mucosa lesions, FOM/tongue/buccal mucosa soft, no induration or swelling.   Neck with erythema in L>R submental and submandibular regions with induration and ttp, no palpable fluctuance, severe ttp over angle of mandible with palpable periparotid LN otherwise no palpable LAD  avila ORNELAS     LABS:  CBC-                        13.3   21.5  )-----------( 216      ( 13 Feb 2018 05:44 )             38.0     BMP/CMP-  13 Feb 2018 05:44    138    |  96     |  16     ----------------------------<  146    3.5     |  26     |  0.73     Ca    9.2        13 Feb 2018 05:44  Phos  3.1       13 Feb 2018 05:44  Mg     1.8       13 Feb 2018 05:44    TPro  8.8    /  Alb  3.4    /  TBili  1.2    /  DBili  x      /  AST  22     /  ALT  18     /  AlkPhos  71     12 Feb 2018 12:36    Coagulation Studies-  PT/INR - ( 12 Feb 2018 12:36 )   PT: 13.4 sec;   INR: 1.21          PTT - ( 12 Feb 2018 12:36 )  PTT:32.0 sec  Endocrine Panel-  Calcium, Total Serum: 9.2 mg/dL (02-13 @ 05:44)  Calcium, Total Serum: 10.2 mg/dL (02-12 @ 12:36)        Assessment/Plan:  67y Male with left soft tissue vs. intramuscular abscess s/p needle aspiration 2/12    -Continue unasyn for abx tx  -f/u culture  -regular diet  -restart home meds  -warm compresses to site  -nicotine patch   -up ad mary  -IS  -DVT ppx: SCDs, SQH  -[facility] at [phone] with any questions/concerns.    Dispo: regional room    seen with chief and d/w attending who agrees with plan above ENT [facility] DAILY PROGRESS NOTE    Overnight events/Interval:  CAMPBELL overnight. AFVSS. Tolerating PO. Pain well controlled.  Denies dyspnea, CP, dysphagia, n/v, f/c/s.   On unasyn for abx tx.   WBC downtrending.       Allergies  No Known Allergies      MEDICATIONS:  Antiinfectives:   ampicillin/sulbactam  IVPB 1.5 Gram(s) IV Intermittent every 6 hours    magnesium oxide 400 milliGRAM(s) Oral once  potassium chloride    Tablet ER 40 milliEquivalent(s) Oral once    Hematologic/Anticoagulation:  heparin  Injectable 5000 Unit(s) SubCutaneous every 12 hours    Pain medications/Neuro:  acetaminophen   Tablet. 650 milliGRAM(s) Oral every 6 hours PRN  ibuprofen  Tablet 400 milliGRAM(s) Oral every 6 hours PRN  ondansetron Injectable 4 milliGRAM(s) IV Push every 6 hours PRN  oxyCODONE    5 mG/acetaminophen 325 mG 1 Tablet(s) Oral every 4 hours PRN    All other standing medications:   chlorhexidine 0.12% Liquid 15 milliLiter(s) Swish and Spit three times a day  influenza   Vaccine 0.5 milliLiter(s) IntraMuscular once      Vital Signs Last 24 Hrs  T(C): 36.6 (13 Feb 2018 05:26), Max: 37.4 (12 Feb 2018 16:02)  T(F): 97.8 (13 Feb 2018 05:26), Max: 99.4 (12 Feb 2018 16:02)  HR: 69 (13 Feb 2018 05:26) (69 - 90)  BP: 117/79 (13 Feb 2018 05:26) (103/70 - 158/84)  RR: 16 (13 Feb 2018 05:26) (16 - 18)  SpO2: 98% (13 Feb 2018 05:26) (97% - 100%)      02-12 @ 07:01  -  02-13 @ 07:00  --------------------------------------------------------  IN:    Oral Fluid: 120 mL  Total IN: 120 mL    OUT:  Total OUT: 0 mL    Total NET: 120 mL            PHYSICAL EXAM:  NAD, alert and appropriate, pleasant  NC/AT, EOMI  nonlabored respirations on RA, no stridor/stertor, tolerating secretions  voice slightly muffled but clear  OC/Op poor dentition, saliva from Reggie's and Tennyson's duct, no mucosa lesions, FOM/tongue/buccal mucosa soft, no induration or swelling.   Neck with erythema in L>R submental and submandibular regions with induration and ttp, no palpable fluctuance.  Severely tender over left angle of mandible with palpable periparotid LN; otherwise no palpable LAD  Neuro: CN 2-12 intact    LABS:  CBC-                        13.3   21.5  )-----------( 216      ( 13 Feb 2018 05:44 )             38.0     BMP/CMP-  13 Feb 2018 05:44    138    |  96     |  16     ----------------------------<  146    3.5     |  26     |  0.73     Ca    9.2        13 Feb 2018 05:44  Phos  3.1       13 Feb 2018 05:44  Mg     1.8       13 Feb 2018 05:44    TPro  8.8    /  Alb  3.4    /  TBili  1.2    /  DBili  x      /  AST  22     /  ALT  18     /  AlkPhos  71     12 Feb 2018 12:36    Coagulation Studies-  PT/INR - ( 12 Feb 2018 12:36 )   PT: 13.4 sec;   INR: 1.21       PTT - ( 12 Feb 2018 12:36 )  PTT:32.0 sec  Endocrine Panel-  Calcium, Total Serum: 9.2 mg/dL (02-13 @ 05:44)  Calcium, Total Serum: 10.2 mg/dL (02-12 @ 12:36)        Assessment/Plan:  67y Male with left soft tissue vs. intramuscular abscess submental/submandibular areas, s/p needle aspiration 2/12    -Continue unasyn for abx tx  -f/u culture  -regular diet  -restart home meds  -warm compresses to site  -nicotine patch   -up ad mary  -IS  -DVT ppx: SCDs, SQH  -[facility] at [phone] with any questions/concerns.    Dispo: regional room    seen with chief and d/w attending who agrees with plan above

## 2018-02-14 LAB
HCT VFR BLD CALC: 40.1 % — SIGNIFICANT CHANGE UP (ref 39–50)
HGB BLD-MCNC: 13.4 G/DL — SIGNIFICANT CHANGE UP (ref 13–17)
MCHC RBC-ENTMCNC: 31.4 PG — SIGNIFICANT CHANGE UP (ref 27–34)
MCHC RBC-ENTMCNC: 33.4 G/DL — SIGNIFICANT CHANGE UP (ref 32–36)
MCV RBC AUTO: 93.9 FL — SIGNIFICANT CHANGE UP (ref 80–100)
PLATELET # BLD AUTO: 234 K/UL — SIGNIFICANT CHANGE UP (ref 150–400)
RBC # BLD: 4.27 M/UL — SIGNIFICANT CHANGE UP (ref 4.2–5.8)
RBC # FLD: 12.5 % — SIGNIFICANT CHANGE UP (ref 10.3–16.9)
WBC # BLD: 15.2 K/UL — HIGH (ref 3.8–10.5)
WBC # FLD AUTO: 15.2 K/UL — HIGH (ref 3.8–10.5)

## 2018-02-14 PROCEDURE — 99232 SBSQ HOSP IP/OBS MODERATE 35: CPT

## 2018-02-14 RX ORDER — AMPICILLIN SODIUM AND SULBACTAM SODIUM 250; 125 MG/ML; MG/ML
3 INJECTION, POWDER, FOR SUSPENSION INTRAMUSCULAR; INTRAVENOUS EVERY 6 HOURS
Qty: 0 | Refills: 0 | Status: DISCONTINUED | OUTPATIENT
Start: 2018-02-14 | End: 2018-02-21

## 2018-02-14 RX ORDER — MORPHINE SULFATE 50 MG/1
4 CAPSULE, EXTENDED RELEASE ORAL ONCE
Qty: 0 | Refills: 0 | Status: DISCONTINUED | OUTPATIENT
Start: 2018-02-14 | End: 2018-02-14

## 2018-02-14 RX ORDER — IBUPROFEN 200 MG
400 TABLET ORAL EVERY 6 HOURS
Qty: 0 | Refills: 0 | Status: DISCONTINUED | OUTPATIENT
Start: 2018-02-14 | End: 2018-02-15

## 2018-02-14 RX ORDER — OXYCODONE AND ACETAMINOPHEN 5; 325 MG/1; MG/1
1 TABLET ORAL EVERY 4 HOURS
Qty: 0 | Refills: 0 | Status: DISCONTINUED | OUTPATIENT
Start: 2018-02-14 | End: 2018-02-21

## 2018-02-14 RX ORDER — OXYCODONE AND ACETAMINOPHEN 5; 325 MG/1; MG/1
2 TABLET ORAL EVERY 4 HOURS
Qty: 0 | Refills: 0 | Status: DISCONTINUED | OUTPATIENT
Start: 2018-02-14 | End: 2018-02-15

## 2018-02-14 RX ADMIN — LISINOPRIL 10 MILLIGRAM(S): 2.5 TABLET ORAL at 05:52

## 2018-02-14 RX ADMIN — CHLORHEXIDINE GLUCONATE 15 MILLILITER(S): 213 SOLUTION TOPICAL at 13:51

## 2018-02-14 RX ADMIN — OXYCODONE AND ACETAMINOPHEN 2 TABLET(S): 5; 325 TABLET ORAL at 14:30

## 2018-02-14 RX ADMIN — AMPICILLIN SODIUM AND SULBACTAM SODIUM 100 GRAM(S): 250; 125 INJECTION, POWDER, FOR SUSPENSION INTRAMUSCULAR; INTRAVENOUS at 03:42

## 2018-02-14 RX ADMIN — OXYCODONE AND ACETAMINOPHEN 1 TABLET(S): 5; 325 TABLET ORAL at 05:56

## 2018-02-14 RX ADMIN — AMPICILLIN SODIUM AND SULBACTAM SODIUM 200 GRAM(S): 250; 125 INJECTION, POWDER, FOR SUSPENSION INTRAMUSCULAR; INTRAVENOUS at 14:49

## 2018-02-14 RX ADMIN — OXYCODONE AND ACETAMINOPHEN 1 TABLET(S): 5; 325 TABLET ORAL at 10:20

## 2018-02-14 RX ADMIN — CHLORHEXIDINE GLUCONATE 15 MILLILITER(S): 213 SOLUTION TOPICAL at 21:48

## 2018-02-14 RX ADMIN — AMPICILLIN SODIUM AND SULBACTAM SODIUM 100 GRAM(S): 250; 125 INJECTION, POWDER, FOR SUSPENSION INTRAMUSCULAR; INTRAVENOUS at 09:37

## 2018-02-14 RX ADMIN — MORPHINE SULFATE 4 MILLIGRAM(S): 50 CAPSULE, EXTENDED RELEASE ORAL at 11:58

## 2018-02-14 RX ADMIN — Medication 25 MILLIGRAM(S): at 05:52

## 2018-02-14 RX ADMIN — OXYCODONE AND ACETAMINOPHEN 1 TABLET(S): 5; 325 TABLET ORAL at 09:41

## 2018-02-14 RX ADMIN — HEPARIN SODIUM 5000 UNIT(S): 5000 INJECTION INTRAVENOUS; SUBCUTANEOUS at 18:21

## 2018-02-14 RX ADMIN — Medication 1 DROP(S): at 05:51

## 2018-02-14 RX ADMIN — Medication 1 DROP(S): at 18:21

## 2018-02-14 RX ADMIN — OXYCODONE AND ACETAMINOPHEN 2 TABLET(S): 5; 325 TABLET ORAL at 18:21

## 2018-02-14 RX ADMIN — HEPARIN SODIUM 5000 UNIT(S): 5000 INJECTION INTRAVENOUS; SUBCUTANEOUS at 05:52

## 2018-02-14 RX ADMIN — Medication 400 MILLIGRAM(S): at 05:56

## 2018-02-14 RX ADMIN — OXYCODONE AND ACETAMINOPHEN 2 TABLET(S): 5; 325 TABLET ORAL at 13:49

## 2018-02-14 RX ADMIN — AMPICILLIN SODIUM AND SULBACTAM SODIUM 200 GRAM(S): 250; 125 INJECTION, POWDER, FOR SUSPENSION INTRAMUSCULAR; INTRAVENOUS at 21:48

## 2018-02-14 RX ADMIN — MORPHINE SULFATE 4 MILLIGRAM(S): 50 CAPSULE, EXTENDED RELEASE ORAL at 12:20

## 2018-02-14 RX ADMIN — CHLORHEXIDINE GLUCONATE 15 MILLILITER(S): 213 SOLUTION TOPICAL at 05:48

## 2018-02-14 NOTE — PROCEDURE NOTE - ADDITIONAL PROCEDURE DETAILS
Bedside incision and drainage of left digastric abscess. Verbal consent obtained. 1cc 2%lido with 1:100,000 epi infiltrated into subcutaneous tissues. Skin cleaned with chloraprep solution. 1.5cm incision made using 15-blade scalpel. Abscess pocket bluntly dissected using hemostat clamp. ~3-4cc purulent material drained. 1/4in plain gauze packing place. patient tolerated procedure well without complication.

## 2018-02-14 NOTE — PROGRESS NOTE ADULT - SUBJECTIVE AND OBJECTIVE BOX
[facility] DAILY PROGRESS NOTE    67y Male with left digastric abscess s/p needle aspiration 2/12 2/13: CAMPBELL overnight. AFVSS. Tolerating PO. Pain well controlled. Exam stable. Denies dyspnea, CP, dysphagia, n/v, f/c/s. On unasyn for abx tx. WBC downtrending.   2/14: CAMPBELL. AFVSS. Patient with purulence expressible from prior needle sites so formal I&D performed with 3-4cc pus drained at bedside. Tolerated well. WBC downtrending. Tolerating PO, +odynophagia.     Vital Signs Last 24 Hrs  T(C): 37.2 (14 Feb 2018 12:50), Max: 37.4 (13 Feb 2018 21:43)  T(F): 98.9 (14 Feb 2018 12:50), Max: 99.3 (13 Feb 2018 21:43)  HR: 60 (14 Feb 2018 12:50) (60 - 88)  BP: 94/62 (14 Feb 2018 12:50) (94/62 - 135/82)  RR: 16 (14 Feb 2018 12:50) (16 - 18)  SpO2: 98% (14 Feb 2018 12:50) (95% - 98%)    I&O's Summary    13 Feb 2018 07:01  -  14 Feb 2018 07:00  --------------------------------------------------------  IN: 300 mL / OUT: 0 mL / NET: 300 mL    14 Feb 2018 07:01  -  14 Feb 2018 13:26  --------------------------------------------------------  IN: 360 mL / OUT: 950 mL / NET: -590 mL      PHYSICAL EXAM:  NAD, alert and appropriate  NC/AT  nonlabored respirations on RA, no stridor/stertor, tolerating secretions  voice slightly muffled  OC/Op poor dentition, no mucosa lesions, FOM/tongue/buccal mucosa soft, no induration or swelling.   Neck with erythema in L>R submental and submandibular regions with induration and ttp, purulent material expressed from prior needle sites, severe ttp over angle of mandible with palpable periparotid LN otherwise no palpable LAD  CECI, wwp                           13.4   15.2  )-----------( 234      ( 14 Feb 2018 07:30 )             40.1       Assessment/Plan:  67y Male with left soft tissue vs. intramuscular abscess s/p needle aspiration 2/12    -Continue unasyn for abx tx  -f/u culture  -regular diet  -restart home meds  -warm compresses to site  -nicotine patch   -up ad mary  -IS  -DVT ppx: SCDs, SQH  -[facility] at [phone] with any questions/concerns.    Dispo: regional room    seen with chief and d/w attending who agrees with plan above [facility] DAILY PROGRESS NOTE    67y Male with left digastric abscess s/p needle aspiration 2/12 2/13: CAMPBELL overnight. AFVSS. Tolerating PO. Pain well controlled. Exam stable. Denies dyspnea, CP, dysphagia, n/v, f/c/s. On unasyn for abx tx. WBC downtrending.   2/14: CAMPBELL. AFVSS. Patient with purulence expressible from prior needle sites so formal I&D performed with 3-4cc pus drained at bedside. Tolerated well. WBC downtrending. Tolerating PO, +odynophagia.     Vital Signs Last 24 Hrs  T(C): 37.2 (14 Feb 2018 12:50), Max: 37.4 (13 Feb 2018 21:43)  T(F): 98.9 (14 Feb 2018 12:50), Max: 99.3 (13 Feb 2018 21:43)  HR: 60 (14 Feb 2018 12:50) (60 - 88)  BP: 94/62 (14 Feb 2018 12:50) (94/62 - 135/82)  RR: 16 (14 Feb 2018 12:50) (16 - 18)  SpO2: 98% (14 Feb 2018 12:50) (95% - 98%)    I&O's Summary    13 Feb 2018 07:01  -  14 Feb 2018 07:00  --------------------------------------------------------  IN: 300 mL / OUT: 0 mL / NET: 300 mL    14 Feb 2018 07:01  -  14 Feb 2018 13:26  --------------------------------------------------------  IN: 360 mL / OUT: 950 mL / NET: -590 mL      PHYSICAL EXAM:  NAD, alert and appropriate  NC/AT  nonlabored respirations on RA, no stridor/stertor, tolerating secretions  voice slightly muffled  OC/Op poor dentition, no mucosa lesions, FOM/tongue/buccal mucosa soft, no induration or swelling.   Neck with erythema in L>R submental and submandibular regions with induration and ttp, purulent material expressed from prior needle sites, severe ttp over angle of mandible with palpable periparotid LN otherwise no palpable LAD  CECI, wwp                           13.4   15.2  )-----------( 234      ( 14 Feb 2018 07:30 )             40.1       Assessment/Plan:  67y Male with left soft tissue vs. intramuscular abscess s/p needle aspiration 2/12    -Continue unasyn for abx tx  -f/u culture - GPCs in pairs, GPRs  -regular diet  -continue home meds  -warm compresses to site  -nicotine patch   -up ad mary  -IS  -prn pain/nausea   -DVT ppx: Stephenie SQH  -[facility] at [phone] with any questions/concerns.    Dispo: regional room    seen with chief and d/w attending who agrees with plan above [facility] DAILY PROGRESS NOTE    67y Male with left digastric abscess s/p needle aspiration 2/12 2/13: CAMPBELL overnight. AFVSS. Tolerating PO. Denies dyspnea, CP, dysphagia, n/v, f/c/s. On unasyn for abx tx. WBC downtrending.   2/14: CAMPBELL. AFVSS. Patient with purulence expressible from prior needle sites so formal I&D performed with 3-4cc pus drained at bedside. Tolerated well. c/o pain mainly when has packing change and neck gets squeezed.  WBC downtrending. Tolerating PO, +odynophagia.     Vital Signs Last 24 Hrs  T(C): 37.2 (14 Feb 2018 12:50), Max: 37.4 (13 Feb 2018 21:43)  T(F): 98.9 (14 Feb 2018 12:50), Max: 99.3 (13 Feb 2018 21:43)  HR: 60 (14 Feb 2018 12:50) (60 - 88)  BP: 94/62 (14 Feb 2018 12:50) (94/62 - 135/82)  RR: 16 (14 Feb 2018 12:50) (16 - 18)  SpO2: 98% (14 Feb 2018 12:50) (95% - 98%)    I&O's Summary    13 Feb 2018 07:01  -  14 Feb 2018 07:00  --------------------------------------------------------  IN: 300 mL / OUT: 0 mL / NET: 300 mL    14 Feb 2018 07:01  -  14 Feb 2018 13:26  --------------------------------------------------------  IN: 360 mL / OUT: 950 mL / NET: -590 mL      PHYSICAL EXAM:  NAD, alert and appropriate  NC/AT  nonlabored respirations on RA, no stridor/stertor, tolerating secretions  voice slightly muffled  OC/Op poor dentition, no mucosa lesions, FOM/tongue/buccal mucosa soft, no induration or swelling.   Neck with erythema in L>R submental and submandibular regions with induration and ttp, purulent material expressed from I&D submental site,.  Moderate tenderness over left angle of mandible with palpable periparotid LN otherwise no palpable LAD     LABS                      13.4   15.2  )-----------( 234      ( 14 Feb 2018 07:30 )             40.1     Neck abscess culture - GPCs in pairs, GPRs    Assessment/Plan:  67y Male with left neck and submental abscess s/p needle aspiration 2/12 and I&D submental 2/13    -Continue unasyn for abx tx  -f/u culture - GPCs in pairs, GPRs  -regular diet  -continue home meds  -warm compresses to site  -nicotine patch   -up ad mary  -IS  -prn pain/nausea   -DVT ppx: SCDs, SQH  -[facility] at [phone] with any questions/concerns.    Dispo: regional room    seen with chief and d/w attending who agrees with plan above

## 2018-02-14 NOTE — PROCEDURE NOTE - PROCEDURE
<<-----Click on this checkbox to enter Procedure Incision and drainage of abscess  02/14/2018    Active  URBAN

## 2018-02-15 LAB
-  CEFTRIAXONE: SIGNIFICANT CHANGE UP
-  CLINDAMYCIN: SIGNIFICANT CHANGE UP
-  ERYTHROMYCIN: SIGNIFICANT CHANGE UP
-  PENICILLIN: SIGNIFICANT CHANGE UP
-  VANCOMYCIN: SIGNIFICANT CHANGE UP
GLUCOSE BLDC GLUCOMTR-MCNC: 141 MG/DL — HIGH (ref 70–99)
HCT VFR BLD CALC: 41.5 % — SIGNIFICANT CHANGE UP (ref 39–50)
HGB BLD-MCNC: 13.6 G/DL — SIGNIFICANT CHANGE UP (ref 13–17)
MCHC RBC-ENTMCNC: 31.4 PG — SIGNIFICANT CHANGE UP (ref 27–34)
MCHC RBC-ENTMCNC: 32.8 G/DL — SIGNIFICANT CHANGE UP (ref 32–36)
MCV RBC AUTO: 95.8 FL — SIGNIFICANT CHANGE UP (ref 80–100)
METHOD TYPE: SIGNIFICANT CHANGE UP
METHOD TYPE: SIGNIFICANT CHANGE UP
PLATELET # BLD AUTO: 282 K/UL — SIGNIFICANT CHANGE UP (ref 150–400)
RBC # BLD: 4.33 M/UL — SIGNIFICANT CHANGE UP (ref 4.2–5.8)
RBC # FLD: 12.6 % — SIGNIFICANT CHANGE UP (ref 10.3–16.9)
WBC # BLD: 13.5 K/UL — HIGH (ref 3.8–10.5)
WBC # FLD AUTO: 13.5 K/UL — HIGH (ref 3.8–10.5)

## 2018-02-15 PROCEDURE — 99233 SBSQ HOSP IP/OBS HIGH 50: CPT

## 2018-02-15 PROCEDURE — 70491 CT SOFT TISSUE NECK W/DYE: CPT | Mod: 26

## 2018-02-15 PROCEDURE — 10030 IMG GID FLU COLL DRG SFT TIS: CPT

## 2018-02-15 PROCEDURE — 99152 MOD SED SAME PHYS/QHP 5/>YRS: CPT

## 2018-02-15 PROCEDURE — 99232 SBSQ HOSP IP/OBS MODERATE 35: CPT

## 2018-02-15 RX ORDER — DEXAMETHASONE 0.5 MG/5ML
10 ELIXIR ORAL EVERY 8 HOURS
Qty: 0 | Refills: 0 | Status: DISCONTINUED | OUTPATIENT
Start: 2018-02-15 | End: 2018-02-15

## 2018-02-15 RX ORDER — MORPHINE SULFATE 50 MG/1
2 CAPSULE, EXTENDED RELEASE ORAL EVERY 4 HOURS
Qty: 0 | Refills: 0 | Status: DISCONTINUED | OUTPATIENT
Start: 2018-02-15 | End: 2018-02-21

## 2018-02-15 RX ORDER — DEXAMETHASONE 0.5 MG/5ML
10 ELIXIR ORAL EVERY 8 HOURS
Qty: 0 | Refills: 0 | Status: COMPLETED | OUTPATIENT
Start: 2018-02-15 | End: 2018-02-16

## 2018-02-15 RX ORDER — SODIUM CHLORIDE 9 MG/ML
1000 INJECTION INTRAMUSCULAR; INTRAVENOUS; SUBCUTANEOUS
Qty: 0 | Refills: 0 | Status: DISCONTINUED | OUTPATIENT
Start: 2018-02-15 | End: 2018-02-16

## 2018-02-15 RX ADMIN — Medication 102 MILLIGRAM(S): at 11:24

## 2018-02-15 RX ADMIN — CHLORHEXIDINE GLUCONATE 15 MILLILITER(S): 213 SOLUTION TOPICAL at 05:22

## 2018-02-15 RX ADMIN — AMPICILLIN SODIUM AND SULBACTAM SODIUM 200 GRAM(S): 250; 125 INJECTION, POWDER, FOR SUSPENSION INTRAMUSCULAR; INTRAVENOUS at 23:25

## 2018-02-15 RX ADMIN — Medication 102 MILLIGRAM(S): at 19:31

## 2018-02-15 RX ADMIN — OXYCODONE AND ACETAMINOPHEN 2 TABLET(S): 5; 325 TABLET ORAL at 05:23

## 2018-02-15 RX ADMIN — HEPARIN SODIUM 5000 UNIT(S): 5000 INJECTION INTRAVENOUS; SUBCUTANEOUS at 19:30

## 2018-02-15 RX ADMIN — HEPARIN SODIUM 5000 UNIT(S): 5000 INJECTION INTRAVENOUS; SUBCUTANEOUS at 05:23

## 2018-02-15 RX ADMIN — Medication 1 DROP(S): at 23:17

## 2018-02-15 RX ADMIN — MORPHINE SULFATE 2 MILLIGRAM(S): 50 CAPSULE, EXTENDED RELEASE ORAL at 23:18

## 2018-02-15 RX ADMIN — Medication 1 DROP(S): at 05:27

## 2018-02-15 RX ADMIN — AMPICILLIN SODIUM AND SULBACTAM SODIUM 200 GRAM(S): 250; 125 INJECTION, POWDER, FOR SUSPENSION INTRAMUSCULAR; INTRAVENOUS at 09:09

## 2018-02-15 RX ADMIN — AMPICILLIN SODIUM AND SULBACTAM SODIUM 200 GRAM(S): 250; 125 INJECTION, POWDER, FOR SUSPENSION INTRAMUSCULAR; INTRAVENOUS at 03:53

## 2018-02-15 RX ADMIN — MORPHINE SULFATE 2 MILLIGRAM(S): 50 CAPSULE, EXTENDED RELEASE ORAL at 23:48

## 2018-02-15 RX ADMIN — CHLORHEXIDINE GLUCONATE 15 MILLILITER(S): 213 SOLUTION TOPICAL at 23:17

## 2018-02-15 RX ADMIN — CHLORHEXIDINE GLUCONATE 15 MILLILITER(S): 213 SOLUTION TOPICAL at 13:08

## 2018-02-15 RX ADMIN — Medication 25 MILLIGRAM(S): at 05:23

## 2018-02-15 RX ADMIN — OXYCODONE AND ACETAMINOPHEN 2 TABLET(S): 5; 325 TABLET ORAL at 00:23

## 2018-02-15 RX ADMIN — LISINOPRIL 10 MILLIGRAM(S): 2.5 TABLET ORAL at 05:23

## 2018-02-15 NOTE — DIETITIAN INITIAL EVALUATION ADULT. - NS AS NUTRI INTERV MEALS SNACK
General/healthful diet/Discussed w/ pt option for softer diet- pt wanted to remain on regular diet- will provide alternative soft diet menu for pt to see selection.

## 2018-02-15 NOTE — DIETITIAN INITIAL EVALUATION ADULT. - ENERGY NEEDS
Height: 5'6" Weight: 185lbs, IBW 142lbs+/-10%, %%, BMI 29.8  IBW used for calculations as pt >120% of IBW   Nutrient needs based on [facility] standards of care for maintenance in adults.

## 2018-02-15 NOTE — DIETITIAN INITIAL EVALUATION ADULT. - NS AS NUTRI INTERV MEDICAL AND FOOD SUPPLEMENTS
Commercial beverage/Discussed option of ONS for additional nutrition support. Pt declined at this time.

## 2018-02-15 NOTE — PROGRESS NOTE ADULT - SUBJECTIVE AND OBJECTIVE BOX
[facility] DAILY [facility] DAILY PROGRESS NOTE    HPI: 67y Male with left digastric abscess s/p needle aspiration 2/12 2/13: CAMPBELL overnight. AFVSS. Tolerating PO. Pain well controlled. Exam stable. Denies dyspnea, CP, dysphagia, n/v, f/c/s. On unasyn for abx tx. WBC downtrending.   2/14: CAMPBELL. AFVSS. Patient with purulence expressible from prior needle sites so formal I&D performed with 3-4cc pus drained at bedside. Tolerated well. WBC downtrending. Tolerating PO, +odynophagia.   2/15: AFVSS. Patient with worsening dysphagia and coughing with drinking this AM. Repeat FFL showing worsening pharyngeal/supraglottic edema. No dyspnea or stridor/stertor. Tolerating secretions. Plan for STAT CT and transfer to SDU for continuous pulse ox. WBC continues to downtrend.     ICU Vital Signs Last 24 Hrs  T(C): 36.8 (15 Feb 2018 08:25), Max: 37.2 (14 Feb 2018 12:50)  T(F): 98.3 (15 Feb 2018 08:25), Max: 98.9 (14 Feb 2018 12:50)  HR: 74 (15 Feb 2018 08:25) (60 - 74)  BP: 110/74 (15 Feb 2018 08:25) (94/62 - 110/74)  RR: 16 (15 Feb 2018 08:25) (16 - 16)  SpO2: 95% (15 Feb 2018 08:25) (95% - 98%      I&O's Detail    14 Feb 2018 07:01  -  15 Feb 2018 07:00  --------------------------------------------------------  IN:    Oral Fluid: 480 mL  Total IN: 480 mL    OUT:    Voided: 950 mL  Total OUT: 950 mL    Total NET: -470 mL      PHYSICAL EXAM:  NAD, alert and appropriate  NC/AT  nonlabored respirations on RA, no stridor/stertor, tolerating secretions  voice slightly muffled and gurgly   OC/OPx poor dentition, no mucosa lesions, FOM/tongue/buccal mucosa soft, no induration or swelling.   Neck with erythema improved, less induration at submental area, some purulent material expressed from I&D site, 1/4in plain gauze packing placed  severe ttp over angle of mandible with 2cm palpable periparotid LN otherwise no palpable LAD  ORNELAS, avila     LARYNGOSCOPY EXAM:   -Verbal consent was obtained from patient prior to procedure.  Indication: dysphagia     Flexible laryngoscopy was performed and revealed the following:    -- Nasopharynx had no mass or exudate.    -- Base of tongue was symmetric and not enlarged.    -- Vallecula was clear    -- moderate watery edema of left and posterior pharyngeal walls partially obstructing oropharyngeal airway    -- moderate watery edema of lingual surface of epiglottis extending down L AE fold, arytenoid and false cord worse from prior exam     -- True vocal folds were fully mobile and without lesions or edema     -- Left piriform effaced, R piriform patent     The patient tolerated the procedure well.        Assessment/Plan:  67y Male with left soft tissue vs. intramuscular abscess s/p I&D 2/14 with worsening oropharyngeal/supraglottic edema on repeat FFL c/f possible parapharyngeal vs. retropharyngeal abscess    -STAT Neck CT w/ IV contrast to r/o abscess   -trach tray at bedside as precaution   -Continue unasyn for abx tx  -f/u culture - GPCs in pairs, GPRs  -NPO/IVF  -warm compresses to site  -nicotine patch   -AM CBC  -up ad mary  -IS  -prn pain/nausea   -DVT ppx: SCDs, SQH  -[facility] at [phone] with any questions/concerns.    Dispo: transfer to SDU for continuous pulse ox and airway monitoring     seen with chief and d/w attending who agrees with plan above

## 2018-02-15 NOTE — DIETITIAN INITIAL EVALUATION ADULT. - OTHER INFO
68yo M w/ left digastric abscess s/p needle aspiration 2/12. Pt currently NPO except meds. Previously on a regular diet and tolerating PO. Pt reports opting for softer foods 2/2 difficulty chewing/swallowing. States he regurgitates everything. Discussed soft food menu and option for ONS. Pt declined ONS at this time. Denies GI distress, reports he has not had a BM in "some time" however does not feel constipated. Reports UBW is 185lbs, feels he may have lost wt 2/2 reduced intake. Bedscale wt was 175lbs. Discussed w/ RN obtaining standing wt once medically feasible. Stated height 5'6". NKFA or dietary restrictions. Skin: neck is swollen and reddened; GI WDL per flowsheet. 68yo M w/ left digastric abscess s/p needle aspiration 2/12. Pt currently NPO except meds. Previously on a regular diet and tolerating PO. Pt reports opting for softer foods 2/2 difficulty chewing/swallowing. States he regurgitates everything. Discussed soft food menu and option for ONS. Pt declined ONS at this time. Denies GI distress, reports he has not had a BM in "some time" however does not feel constipated. Reports UBW is 185lbs, feels he may have lost wt 2/2 reduced intake. Bedscale wt was 175lbs. Discussed w/ RN obtaining standing wt once medically feasible. Upon NFPE pt has no visible signs of fat loss or muscle wasting at this time. Stated height 5'6". NKFA or dietary restrictions. Skin: neck is swollen and reddened; GI WDL per flowsheet.

## 2018-02-16 LAB
CULTURE RESULTS: SIGNIFICANT CHANGE UP
GRAM STN FLD: SIGNIFICANT CHANGE UP
HCT VFR BLD CALC: 38.6 % — LOW (ref 39–50)
HGB BLD-MCNC: 12.9 G/DL — LOW (ref 13–17)
MCHC RBC-ENTMCNC: 31.7 PG — SIGNIFICANT CHANGE UP (ref 27–34)
MCHC RBC-ENTMCNC: 33.4 G/DL — SIGNIFICANT CHANGE UP (ref 32–36)
MCV RBC AUTO: 94.8 FL — SIGNIFICANT CHANGE UP (ref 80–100)
NIGHT BLUE STAIN TISS: SIGNIFICANT CHANGE UP
ORGANISM # SPEC MICROSCOPIC CNT: SIGNIFICANT CHANGE UP
PLATELET # BLD AUTO: 286 K/UL — SIGNIFICANT CHANGE UP (ref 150–400)
RBC # BLD: 4.07 M/UL — LOW (ref 4.2–5.8)
RBC # FLD: 12.5 % — SIGNIFICANT CHANGE UP (ref 10.3–16.9)
SPECIMEN SOURCE: SIGNIFICANT CHANGE UP
WBC # BLD: 10.5 K/UL — SIGNIFICANT CHANGE UP (ref 3.8–10.5)
WBC # FLD AUTO: 10.5 K/UL — SIGNIFICANT CHANGE UP (ref 3.8–10.5)

## 2018-02-16 PROCEDURE — 99232 SBSQ HOSP IP/OBS MODERATE 35: CPT

## 2018-02-16 RX ADMIN — AMPICILLIN SODIUM AND SULBACTAM SODIUM 200 GRAM(S): 250; 125 INJECTION, POWDER, FOR SUSPENSION INTRAMUSCULAR; INTRAVENOUS at 11:11

## 2018-02-16 RX ADMIN — AMPICILLIN SODIUM AND SULBACTAM SODIUM 200 GRAM(S): 250; 125 INJECTION, POWDER, FOR SUSPENSION INTRAMUSCULAR; INTRAVENOUS at 17:45

## 2018-02-16 RX ADMIN — HEPARIN SODIUM 5000 UNIT(S): 5000 INJECTION INTRAVENOUS; SUBCUTANEOUS at 17:44

## 2018-02-16 RX ADMIN — HEPARIN SODIUM 5000 UNIT(S): 5000 INJECTION INTRAVENOUS; SUBCUTANEOUS at 05:36

## 2018-02-16 RX ADMIN — Medication 1 DROP(S): at 05:38

## 2018-02-16 RX ADMIN — AMPICILLIN SODIUM AND SULBACTAM SODIUM 200 GRAM(S): 250; 125 INJECTION, POWDER, FOR SUSPENSION INTRAMUSCULAR; INTRAVENOUS at 22:09

## 2018-02-16 RX ADMIN — CHLORHEXIDINE GLUCONATE 15 MILLILITER(S): 213 SOLUTION TOPICAL at 13:37

## 2018-02-16 RX ADMIN — Medication 1 DROP(S): at 17:47

## 2018-02-16 RX ADMIN — OXYCODONE AND ACETAMINOPHEN 1 TABLET(S): 5; 325 TABLET ORAL at 22:16

## 2018-02-16 RX ADMIN — AMPICILLIN SODIUM AND SULBACTAM SODIUM 200 GRAM(S): 250; 125 INJECTION, POWDER, FOR SUSPENSION INTRAMUSCULAR; INTRAVENOUS at 05:37

## 2018-02-16 RX ADMIN — CHLORHEXIDINE GLUCONATE 15 MILLILITER(S): 213 SOLUTION TOPICAL at 05:43

## 2018-02-16 RX ADMIN — OXYCODONE AND ACETAMINOPHEN 1 TABLET(S): 5; 325 TABLET ORAL at 22:46

## 2018-02-16 RX ADMIN — Medication 102 MILLIGRAM(S): at 03:51

## 2018-02-16 RX ADMIN — CHLORHEXIDINE GLUCONATE 15 MILLILITER(S): 213 SOLUTION TOPICAL at 22:09

## 2018-02-16 NOTE — PROGRESS NOTE ADULT - SUBJECTIVE AND OBJECTIVE BOX
66 y/o M s/p neck abscess drainage tube placement. No complaints.  Drain output: 0cc/last 24 hours as of this morning. Purulent fluid seen in the tubing and bulb.  Flushed with 5cc NS.

## 2018-02-16 NOTE — PROGRESS NOTE ADULT - SUBJECTIVE AND OBJECTIVE BOX
[facility] PROGRESS NOTE    HPI: 67y Male with left digastric abscess s/p needle aspiration 2/12 2/13: CAMPBELL overnight. AFVSS. Tolerating PO. Pain well controlled. Exam stable. Denies dyspnea, CP, dysphagia, n/v, f/c/s. On unasyn for abx tx. WBC downtrending.   2/14: CAMPBELL. AFVSS. Patient with purulence expressible from prior needle sites so formal I&D performed with 3-4cc pus drained at bedside. Tolerated well. WBC downtrending. Tolerating PO, +odynophagia.   2/15: AFVSS. Patient with worsening dysphagia and coughing with drinking this AM. Repeat FFL showing worsening pharyngeal/supraglottic edema. No dyspnea or stridor/stertor. Tolerating secretions. Plan for STAT CT and transfer to SDU for continuous pulse ox. WBC continues to downtrend.   2/16: s/p urgent IR drainage of parapharyngeal space abscess, 1 neck drain placed. tolerated well. Voice improved this AM. Has not trialed PO. Tolerating secretions. Denies dyspnea, stridor/stertor. AFVSS.      Vital Signs Last 24 Hrs  T(C): 36.7 (16 Feb 2018 09:09), Max: 36.7 (15 Feb 2018 21:20)  T(F): 98.1 (16 Feb 2018 09:09), Max: 98.1 (15 Feb 2018 21:20)  HR: 62 (16 Feb 2018 08:42) (62 - 85)  BP: 124/55 (16 Feb 2018 08:42) (102/69 - 124/55)  BP(mean): 77 (16 Feb 2018 08:42) (77 - 78)  RR: 17 (16 Feb 2018 05:20) (16 - 19)  SpO2: 96% (16 Feb 2018 08:42) (94% - 98%)      I&O's Summary    15 Feb 2018 07:01  -  16 Feb 2018 07:00  --------------------------------------------------------  IN: 1150 mL / OUT: 1 mL / NET: 1149 mL        PHYSICAL EXAM:  NAD, alert and appropriate  NC/AT  nonlabored respirations on RA, no stridor/stertor, tolerating secretions  voice slightly muffled but less wet sounding, clear  OC/OPx poor dentition, no mucosa lesions, FOM/tongue/buccal mucosa soft, no induration or swelling.   Neck with erythema improved, induration at submental I&D site, no purulence expressed, 1/4in plain gauze packing in place  L lateral neck soft flat, dressing in place   periparotid node less ttp   drain holding bulb suction with purulent material  avila ORNELAS     LARYNGOSCOPY EXAM:   -Verbal consent was obtained from patient prior to procedure.  Indication: dysphagia   Afrin sprayed in R naris for topical anesthesia     Flexible laryngoscopy was performed and revealed the following:    -- Nasopharynx had no mass or exudate.    -- Base of tongue was symmetric and not enlarged.    -- Vallecula was clear    -- edema of left and posterior pharyngeal walls markedly improved, some mild hooding but less than prior     -- mild watery edema of lingual surface of epiglottis extending down L AE fold, arytenoid and false cord improved from prior exam    -- True vocal folds were fully mobile and without lesions or edema     -- b/l piriforms patent and visible    -- airway patent, no pooling of secretions    The patient tolerated the procedure well.    Labs:                        12.9   10.5  )-----------( 286      ( 16 Feb 2018 05:46 )             38.6     Culture: strep milleri, viridans group, pansensitive     Imaging:   CT neck w/ IV contrast: INTERPRETATION:  A thin section axial acquisition was performed from the skull base to the thoracic inlet following intravenous contrast   administration.  The data was reformatted in the sagittal, coronal and   axial planes.Clinical information: History of digastric abscess status post I&D with   worsening supraglottic and pharyngeal edemacurrent study is compared with previous scan dated 12/12/2018.    There is evidence of recent intervention with a skin defect in the left   submental region and gas bubbles within residual fluid in the left   digastric anterior belly. There has been interval increase in   prelaryngeal and perithyroidal soft tissue swelling, now present in   association with areas of low attenuation and internal gas bubbles that   extend inferiorly to the level of the sternal notch, without evidence of   extension into the anterior mediastinum. There has also been interval   increase in volume of abscess within the more posterior aspects of the   digastric anterior belly, as well as within the posterior belly with new   evidence of lateral fistulization into the left parotid gland well   demonstrated on axial image 44 and coronal image 49. There is also new   extension of this multifocal abscess into the left parapharyngeal and   retropharyngeal spaces. Inferior extension of the retropharyngeal   component extends to approximately the level of the cricoid cartilage,   without evidence for involvement of the prevertebral or danger spaces.   There has been marked progression of epiglottic and left aryepiglottic   edema, now associated with airway compromise at the supraglottic level.    There remains mild reactive appearing cyst cervical lymph node   enlargement without evidence of suppurative change. There is attenuation   of the patent left internal jugular chain by the posterior digastric   belly component of the abscess, without evidence of carotid narrowing.   The orbital contents and the visualized portions of the intracranial   contents remain unremarkable. The lung apices remain clear.    IMPRESSION: n this patient status post incision and drainage of left digastric   abscess, there has been progression of this multifocal collection with   new extension into the parapharyngeal and retropharyngeal regions and   into the left parotid gland. New gas-containing phlegmon is identified in   the anterior cervical soft tissues, extending inferiorly to the sternal   notch. Please see report above for further detail.      Assessment/Plan:  67y Male with left soft tissue vs. intramuscular abscess s/p I&D 2/14 with worsening oropharyngeal/supraglottic edema on repeat FFL c/f possible parapharyngeal vs. retropharyngeal abscess    -trach tray at bedside as precaution   -Continue unasyn for abx tx  -f/u IR culture   -Mech soft diet/thickened liquids   -d/c IVF  -Drain care  -warm compresses to site  -nicotine patch   -up ad mary  -IS  -prn pain/nausea   -DVT ppx: SCDs, SQH  -[facility] at [phone] with any questions/concerns.    Dispo: SDU for continuous pulse ox     seen with chief and attending who agrees with plan above [facility] PROGRESS NOTE    HPI: 67y Male with submental and deep left neck abscess     2/12: admitted from ED; s/p needle aspiration submental abscess (1.5 ml pus obtained).  Placed on Unasyn  2/13: CAMPBELL overnight. AFVSS. Tolerating PO. Pain well controlled. Exam stable. Denies dyspnea, CP, dysphagia, n/v, f/c/s. On unasyn for abx tx. WBC downtrending.   2/14: CAMPBELL. AFVSS. Patient with purulence expressible from prior needle sites so formal I&D performed with 3-4cc pus drained at bedside. Tolerated well. WBC downtrending. Tolerating PO, +odynophagia.   2/15: AFVSS. Patient with worsening dysphagia and coughing with drinking this AM. Repeat FFL showing worsening pharyngeal/supraglottic edema. No dyspnea or stridor/stertor. Tolerating secretions. Had STAT CT and transferred to SDU for continuous pulse ox. WBC continues to downtrend. Had IR drainage of left parapharyngeal space abscess seen on CT.  2/16: s/p urgent IR drainage of parapharyngeal space abscess on 2/15/2018; 1 neck drain placed. tolerated well. Voice improved this AM. Has not trialed PO. Tolerating secretions. Denies dyspnea, stridor/stertor. AFVSS.      Vital Signs Last 24 Hrs  T(C): 36.7 (16 Feb 2018 09:09), Max: 36.7 (15 Feb 2018 21:20)  T(F): 98.1 (16 Feb 2018 09:09), Max: 98.1 (15 Feb 2018 21:20)  HR: 62 (16 Feb 2018 08:42) (62 - 85)  BP: 124/55 (16 Feb 2018 08:42) (102/69 - 124/55)  BP(mean): 77 (16 Feb 2018 08:42) (77 - 78)  RR: 17 (16 Feb 2018 05:20) (16 - 19)  SpO2: 96% (16 Feb 2018 08:42) (94% - 98%)      I&O's Summary    15 Feb 2018 07:01  -  16 Feb 2018 07:00  --------------------------------------------------------  IN: 1150 mL / OUT: 1 mL / NET: 1149 mL        PHYSICAL EXAM:  NAD, alert and appropriate  NC/AT  nonlabored respirations on RA, no stridor/stertor, tolerating secretions  voice slightly muffled but less wet sounding, clear  OC/OPx poor dentition, no mucosa lesions, FOM/tongue/buccal mucosa soft, no induration or swelling.   Neck with erythema improved, induration at submental I&D site, no purulence expressed, 1/4in plain gauze packing in place  Left lateral neck soft flat, dressing in place  Left periparotid node less tender  Left drain holding bulb suction with purulent material  avila ORNELAS     LARYNGOSCOPY EXAM:   -Verbal consent was obtained from patient prior to procedure.  Indication: dysphagia   Afrin sprayed in R naris for topical anesthesia     Flexible laryngoscopy was performed and revealed the following:    -- Nasopharynx had no mass or exudate.    -- Base of tongue was symmetric and not enlarged.    -- Vallecula was clear    -- edema of left and posterior pharyngeal walls markedly improved, some mild hooding but less than prior     -- mild watery edema of lingual surface of epiglottis extending down L AE fold, arytenoid and false cord improved from prior exam    -- True vocal folds were fully mobile and without lesions or edema     -- b/l piriforms patent and visible    -- airway patent, no pooling of secretions    The patient tolerated the procedure well.    Labs:                        12.9   10.5  )-----------( 286      ( 16 Feb 2018 05:46 )             38.6     Culture: strep milleri, viridans group, pansensitive     Imaging:   CT neck w/ IV contrast (02/15/2018)  INTERPRETATION:  A thin section axial acquisition was performed from the skull base to the thoracic inlet following intravenous contrast   administration.  The data was reformatted in the sagittal, coronal and   axial planes.Clinical information: History of digastric abscess status post I&D with   worsening supraglottic and pharyngeal edemacurrent study is compared with previous scan dated 12/12/2018.    There is evidence of recent intervention with a skin defect in the left   submental region and gas bubbles within residual fluid in the left   digastric anterior belly. There has been interval increase in   prelaryngeal and perithyroidal soft tissue swelling, now present in   association with areas of low attenuation and internal gas bubbles that   extend inferiorly to the level of the sternal notch, without evidence of   extension into the anterior mediastinum. There has also been interval   increase in volume of abscess within the more posterior aspects of the   digastric anterior belly, as well as within the posterior belly with new   evidence of lateral fistulization into the left parotid gland well   demonstrated on axial image 44 and coronal image 49. There is also new   extension of this multifocal abscess into the left parapharyngeal and   retropharyngeal spaces. Inferior extension of the retropharyngeal   component extends to approximately the level of the cricoid cartilage,   without evidence for involvement of the prevertebral or danger spaces.   There has been marked progression of epiglottic and left aryepiglottic   edema, now associated with airway compromise at the supraglottic level.    There remains mild reactive appearing cyst cervical lymph node   enlargement without evidence of suppurative change. There is attenuation   of the patent left internal jugular chain by the posterior digastric   belly component of the abscess, without evidence of carotid narrowing.   The orbital contents and the visualized portions of the intracranial   contents remain unremarkable. The lung apices remain clear.    IMPRESSION: n this patient status post incision and drainage of left digastric   abscess, there has been progression of this multifocal collection with   new extension into the parapharyngeal and retropharyngeal regions and   into the left parotid gland. New gas-containing phlegmon is identified in   the anterior cervical soft tissues, extending inferiorly to the sternal   notch. Please see report above for further detail.      Assessment/Plan:  67y Male with left deep neck abscess and submental abscess s/p I&D 2/14 with worsening oropharyngeal/supraglottic edema on repeat FFL.  Had IR drain placed in left parapharyngeal abscess  Laryngopharyngeal swelling much better today.    -trach tray at bedside as precaution   -Continue unasyn for abx tx  -f/u IR culture   -Mech soft diet/thickened liquids   -d/c IVF  -Drain care  -warm compresses to site  -nicotine patch   -up ad mary  -IS  -prn pain/nausea   -DVT ppx: SCDs, SQH  -[facility] at [phone] with any questions/concerns.    Dispo: SDU for continuous pulse ox     seen with chief and attending who agrees with plan above

## 2018-02-17 LAB
BASOPHILS NFR BLD AUTO: 0.1 % — SIGNIFICANT CHANGE UP (ref 0–2)
CULTURE RESULTS: SIGNIFICANT CHANGE UP
CULTURE RESULTS: SIGNIFICANT CHANGE UP
EOSINOPHIL NFR BLD AUTO: 0.1 % — SIGNIFICANT CHANGE UP (ref 0–6)
EXTRA GREEN TOP TUBE: SIGNIFICANT CHANGE UP
GLUCOSE BLDC GLUCOMTR-MCNC: 129 MG/DL — HIGH (ref 70–99)
HCT VFR BLD CALC: 37 % — LOW (ref 39–50)
HGB BLD-MCNC: 12.1 G/DL — LOW (ref 13–17)
LYMPHOCYTES # BLD AUTO: 15.6 % — SIGNIFICANT CHANGE UP (ref 13–44)
MCHC RBC-ENTMCNC: 31.4 PG — SIGNIFICANT CHANGE UP (ref 27–34)
MCHC RBC-ENTMCNC: 32.7 G/DL — SIGNIFICANT CHANGE UP (ref 32–36)
MCV RBC AUTO: 96.1 FL — SIGNIFICANT CHANGE UP (ref 80–100)
MONOCYTES NFR BLD AUTO: 14.6 % — HIGH (ref 2–14)
NEUTROPHILS NFR BLD AUTO: 69.6 % — SIGNIFICANT CHANGE UP (ref 43–77)
PLATELET # BLD AUTO: 279 K/UL — SIGNIFICANT CHANGE UP (ref 150–400)
RBC # BLD: 3.85 M/UL — LOW (ref 4.2–5.8)
RBC # FLD: 12.4 % — SIGNIFICANT CHANGE UP (ref 10.3–16.9)
SPECIMEN SOURCE: SIGNIFICANT CHANGE UP
SPECIMEN SOURCE: SIGNIFICANT CHANGE UP
WBC # BLD: 10.9 K/UL — HIGH (ref 3.8–10.5)
WBC # FLD AUTO: 10.9 K/UL — HIGH (ref 3.8–10.5)

## 2018-02-17 PROCEDURE — 99232 SBSQ HOSP IP/OBS MODERATE 35: CPT

## 2018-02-17 RX ADMIN — Medication 1 DROP(S): at 17:03

## 2018-02-17 RX ADMIN — Medication 1 DROP(S): at 06:02

## 2018-02-17 RX ADMIN — CHLORHEXIDINE GLUCONATE 15 MILLILITER(S): 213 SOLUTION TOPICAL at 06:01

## 2018-02-17 RX ADMIN — Medication 25 MILLIGRAM(S): at 06:02

## 2018-02-17 RX ADMIN — OXYCODONE AND ACETAMINOPHEN 1 TABLET(S): 5; 325 TABLET ORAL at 18:00

## 2018-02-17 RX ADMIN — AMPICILLIN SODIUM AND SULBACTAM SODIUM 200 GRAM(S): 250; 125 INJECTION, POWDER, FOR SUSPENSION INTRAMUSCULAR; INTRAVENOUS at 17:03

## 2018-02-17 RX ADMIN — OXYCODONE AND ACETAMINOPHEN 1 TABLET(S): 5; 325 TABLET ORAL at 17:33

## 2018-02-17 RX ADMIN — LISINOPRIL 10 MILLIGRAM(S): 2.5 TABLET ORAL at 06:02

## 2018-02-17 RX ADMIN — AMPICILLIN SODIUM AND SULBACTAM SODIUM 200 GRAM(S): 250; 125 INJECTION, POWDER, FOR SUSPENSION INTRAMUSCULAR; INTRAVENOUS at 11:18

## 2018-02-17 RX ADMIN — OXYCODONE AND ACETAMINOPHEN 1 TABLET(S): 5; 325 TABLET ORAL at 22:33

## 2018-02-17 RX ADMIN — HEPARIN SODIUM 5000 UNIT(S): 5000 INJECTION INTRAVENOUS; SUBCUTANEOUS at 17:03

## 2018-02-17 RX ADMIN — CHLORHEXIDINE GLUCONATE 15 MILLILITER(S): 213 SOLUTION TOPICAL at 13:51

## 2018-02-17 RX ADMIN — AMPICILLIN SODIUM AND SULBACTAM SODIUM 200 GRAM(S): 250; 125 INJECTION, POWDER, FOR SUSPENSION INTRAMUSCULAR; INTRAVENOUS at 06:01

## 2018-02-17 RX ADMIN — HEPARIN SODIUM 5000 UNIT(S): 5000 INJECTION INTRAVENOUS; SUBCUTANEOUS at 05:59

## 2018-02-17 RX ADMIN — CHLORHEXIDINE GLUCONATE 15 MILLILITER(S): 213 SOLUTION TOPICAL at 22:34

## 2018-02-17 RX ADMIN — AMPICILLIN SODIUM AND SULBACTAM SODIUM 200 GRAM(S): 250; 125 INJECTION, POWDER, FOR SUSPENSION INTRAMUSCULAR; INTRAVENOUS at 22:34

## 2018-02-17 NOTE — PROGRESS NOTE ADULT - SUBJECTIVE AND OBJECTIVE BOX
[facility] PROGRESS NOTE    HPI: 67y Male with left digastric abscess s/p needle aspiration 2/12 2/13: CAMPBELL overnight. AFVSS. Tolerating PO. Pain well controlled. Exam stable. Denies dyspnea, CP, dysphagia, n/v, f/c/s. On unasyn for abx tx. WBC downtrending.   2/14: CAMPBELL. AFVSS. Patient with purulence expressible from prior needle sites so formal I&D performed with 3-4cc pus drained at bedside. Tolerated well. WBC downtrending. Tolerating PO, +odynophagia.   2/15: AFVSS. Patient with worsening dysphagia and coughing with drinking this AM. Repeat FFL showing worsening pharyngeal/supraglottic edema. No dyspnea or stridor/stertor. Tolerating secretions. Plan for STAT CT and transfer to SDU for continuous pulse ox. WBC continues to downtrend.   2/16: s/p urgent IR drainage of parapharyngeal space abscess, 1 neck drain placed. tolerated well. Voice improved this AM. Has not trialed PO. Tolerating secretions. Denies dyspnea, stridor/stertor. AFVSS.  2/17: CAMPBELL. AFVSS. Pain improved. Dysphagia improved. Tolerating soft diet. No dyspnea. Continues to drain thick purulent material from drain. WBC stable.       Vital Signs Last 24 Hrs  T(C): 36.2 (17 Feb 2018 10:00), Max: 37.2 (16 Feb 2018 13:52)  T(F): 97.2 (17 Feb 2018 10:00), Max: 99 (16 Feb 2018 13:52)  HR: 93 (17 Feb 2018 08:36) (52 - 93)  BP: 135/84 (17 Feb 2018 08:36) (115/74 - 135/84)  BP(mean): 94 (17 Feb 2018 05:52) (88 - 94)  RR: 18 (17 Feb 2018 08:36) (17 - 19)  SpO2: 97% (17 Feb 2018 08:36) (92% - 98%)        I&O's Summary    16 Feb 2018 07:01  -  17 Feb 2018 07:00  --------------------------------------------------------  IN: 600 mL / OUT: 13 mL / NET: 587 mL          PHYSICAL EXAM:  NAD, alert and appropriate  nonlabored respirations on RA, no stridor/stertor, tolerating secretions  voice strong and clear  OC/OPx poor dentition, no mucosa lesions, FOM/tongue/buccal mucosa soft, no induration or swelling.   Neck erythema improved, 1cm area of residual induration around submental I&D site, mild purulence expressed, 1/4in plain gauze packing in place  L lateral neck soft flat, dressing in place, no ttp over angle of mandible   drain holding bulb suction with purulent material  avila ORNELAS     LARYNGOSCOPY EXAM:   -Verbal consent was obtained from patient prior to procedure.  Indication: dysphagia     Flexible laryngoscopy was performed and revealed the following:    -- Nasopharynx had no mass or exudate.    -- Base of tongue was symmetric and not enlarged.    -- Vallecula was clear    -- pharyngeal wall edema significantly improved with some mild residual edema, no longer hooding over glottis     -- minimal edema at lingual surface of epiglottis, vallecula clear, edema of AE fold and arytenoid improved with only trace edema remaining     -- True vocal folds were fully mobile and without lesions or edema     -- b/l piriforms patent and visible    -- airway widely patent, no pooling of secretions    The patient tolerated the procedure well.    Labs:                        12.1   10.9  )-----------( 279      ( 17 Feb 2018 05:33 )             37.0       Culture 2/13 : strep milleri, viridans group, pansensitive   Cx 2/15: NGTD, no AFB        Assessment/Plan:  67y Male with left soft tissue vs. intramuscular abscess s/p I&D 2/14 with worsening oropharyngeal/supraglottic edema on repeat FFL and development of large multifocal parapharyngeal abscess on repeat imaging s/p IR drainage 2/15. Improving, airway edema largely resolved.    -Continue unasyn for abx tx as initial culture strep viridans pansensitive   -f/u IR culture - NGTD  [facility]   -Drain [facility]  -warm compresses to site  -nicotine patch   -home meds  -up ad mary  -IS  -prn pain/nausea   -DVT ppx: SCDs, SQ  -[facility] at [phone] with any questions/concerns.    Dispo: transfer to regional room       seen with chief and d/w attending who agrees with plan above [facility] PROGRESS NOTE    HPI: 67y Male with submental and left parapharyngeal space abscess     2/12: admitted via ED; s/p needle aspiration submental abscess  2/13: CAMPBELL overnight. AFVSS. Tolerating PO. Pain well controlled. Exam stable. Denies dyspnea, CP, dysphagia, n/v, f/c/s. On unasyn for abx tx. WBC downtrending.   2/14: CAMPBELL. AFVSS. Patient with purulence expressible from prior needle sites so formal I&D performed with 3-4cc pus drained at bedside. Tolerated well. WBC downtrending. Tolerating PO, +odynophagia.   2/15: AFVSS. Patient with worsening dysphagia and coughing with drinking this AM. Repeat FFL showing worsening pharyngeal/supraglottic edema. No dyspnea or stridor/stertor. Tolerating secretions. Had Neck CT and transfer to SDU for continuous pulse ox. WBC continues to downtrend. IR placement of drain in left parapharyngeal space abscess seen in CT.  2/16: s/p urgent IR drainage of parapharyngeal space abscess, 1 neck drain placed. tolerated well. Voice improved this AM. Has not trialed PO. Tolerating secretions. Denies dyspnea, stridor/stertor. AFVSS.  decreased edema laryngopharynx.  2/17: CAMPBELL. AFVSS. Pain improved. Dysphagia improved. Tolerating soft diet. No dyspnea. Continues to drain thick purulent material from drain. WBC stable.       Vital Signs Last 24 Hrs  T(C): 36.2 (17 Feb 2018 10:00), Max: 37.2 (16 Feb 2018 13:52)  T(F): 97.2 (17 Feb 2018 10:00), Max: 99 (16 Feb 2018 13:52)  HR: 93 (17 Feb 2018 08:36) (52 - 93)  BP: 135/84 (17 Feb 2018 08:36) (115/74 - 135/84)  BP(mean): 94 (17 Feb 2018 05:52) (88 - 94)  RR: 18 (17 Feb 2018 08:36) (17 - 19)  SpO2: 97% (17 Feb 2018 08:36) (92% - 98%)    I&O's Summary    16 Feb 2018 07:01  -  17 Feb 2018 07:00  --------------------------------------------------------  IN: 600 mL / OUT: 13 mL / NET: 587 mL      PHYSICAL EXAM:  NAD, alert and appropriate  nonlabored respirations on RA, no stridor/stertor, tolerating secretions  voice strong and clear  OC/OPx poor dentition, no mucosa lesions, FOM/tongue/buccal mucosa soft, no induration or swelling.   Neck erythema improved, 1cm area of residual induration around submental I&D site, mild purulence expressed, 1/4in plain gauze packing in place  Left lateral neck soft flat, dressing in place, no ttp over angle of mandible   Left drain holding bulb suction with purulent material  avila ORNELAS     LARYNGOSCOPY EXAM:   -Verbal consent was obtained from patient prior to procedure.  Indication: dysphagia     Flexible laryngoscopy was performed and revealed the following:    -- Nasopharynx had no mass or exudate.    -- Base of tongue was symmetric and not enlarged.    -- Vallecula was clear    -- pharyngeal wall edema significantly improved with some mild residual edema, no longer hooding over glottis     -- minimal edema at lingual surface of epiglottis, vallecula clear, edema of AE fold and arytenoid improved with only trace edema remaining     -- True vocal folds were fully mobile and without lesions or edema     -- b/l piriforms patent and visible    -- airway widely patent, no pooling of secretions    The patient tolerated the procedure well.    Labs:                        12.1   10.9  )-----------( 279      ( 17 Feb 2018 05:33 )             37.0       Culture 2/13 : strep milleri, viridans group, pansensitive   Cx 2/15: NGTD, no AFB        Assessment/Plan:  67y Male with left deep neck abscess s/p I&D submental 2/14 and s/p IR drainage 2/15. Improving, airway edema largely resolved.    -Continue unasyn for abx tx as initial culture strep viridans pansensitive   -f/u IR culture - NGTD  -[facility] soft diet/thickened liquids   -Drain care  -warm compresses to site  -nicotine patch   -home meds  -up ad mary  -IS  -prn pain/nausea   -DVT ppx: SCDs, SQH  -[facility] at [phone] with any questions/concerns.    Dispo: transfer to regional room       seen with chief and d/w attending who agrees with plan above

## 2018-02-18 LAB
HCT VFR BLD CALC: 39.2 % — SIGNIFICANT CHANGE UP (ref 39–50)
HGB BLD-MCNC: 13 G/DL — SIGNIFICANT CHANGE UP (ref 13–17)
MCHC RBC-ENTMCNC: 31.7 PG — SIGNIFICANT CHANGE UP (ref 27–34)
MCHC RBC-ENTMCNC: 33.2 G/DL — SIGNIFICANT CHANGE UP (ref 32–36)
MCV RBC AUTO: 95.6 FL — SIGNIFICANT CHANGE UP (ref 80–100)
PLATELET # BLD AUTO: 286 K/UL — SIGNIFICANT CHANGE UP (ref 150–400)
RBC # BLD: 4.1 M/UL — LOW (ref 4.2–5.8)
RBC # FLD: 12.3 % — SIGNIFICANT CHANGE UP (ref 10.3–16.9)
WBC # BLD: 7.5 K/UL — SIGNIFICANT CHANGE UP (ref 3.8–10.5)
WBC # FLD AUTO: 7.5 K/UL — SIGNIFICANT CHANGE UP (ref 3.8–10.5)

## 2018-02-18 PROCEDURE — 99231 SBSQ HOSP IP/OBS SF/LOW 25: CPT

## 2018-02-18 RX ADMIN — AMPICILLIN SODIUM AND SULBACTAM SODIUM 200 GRAM(S): 250; 125 INJECTION, POWDER, FOR SUSPENSION INTRAMUSCULAR; INTRAVENOUS at 10:48

## 2018-02-18 RX ADMIN — HEPARIN SODIUM 5000 UNIT(S): 5000 INJECTION INTRAVENOUS; SUBCUTANEOUS at 18:04

## 2018-02-18 RX ADMIN — HEPARIN SODIUM 5000 UNIT(S): 5000 INJECTION INTRAVENOUS; SUBCUTANEOUS at 06:19

## 2018-02-18 RX ADMIN — MORPHINE SULFATE 2 MILLIGRAM(S): 50 CAPSULE, EXTENDED RELEASE ORAL at 11:00

## 2018-02-18 RX ADMIN — Medication 1 DROP(S): at 06:18

## 2018-02-18 RX ADMIN — AMPICILLIN SODIUM AND SULBACTAM SODIUM 200 GRAM(S): 250; 125 INJECTION, POWDER, FOR SUSPENSION INTRAMUSCULAR; INTRAVENOUS at 18:04

## 2018-02-18 RX ADMIN — CHLORHEXIDINE GLUCONATE 15 MILLILITER(S): 213 SOLUTION TOPICAL at 06:20

## 2018-02-18 RX ADMIN — Medication 1 DROP(S): at 18:04

## 2018-02-18 RX ADMIN — OXYCODONE AND ACETAMINOPHEN 1 TABLET(S): 5; 325 TABLET ORAL at 14:58

## 2018-02-18 RX ADMIN — OXYCODONE AND ACETAMINOPHEN 1 TABLET(S): 5; 325 TABLET ORAL at 19:14

## 2018-02-18 RX ADMIN — CHLORHEXIDINE GLUCONATE 15 MILLILITER(S): 213 SOLUTION TOPICAL at 22:18

## 2018-02-18 RX ADMIN — OXYCODONE AND ACETAMINOPHEN 1 TABLET(S): 5; 325 TABLET ORAL at 18:14

## 2018-02-18 RX ADMIN — OXYCODONE AND ACETAMINOPHEN 1 TABLET(S): 5; 325 TABLET ORAL at 22:18

## 2018-02-18 RX ADMIN — MORPHINE SULFATE 2 MILLIGRAM(S): 50 CAPSULE, EXTENDED RELEASE ORAL at 10:43

## 2018-02-18 RX ADMIN — OXYCODONE AND ACETAMINOPHEN 1 TABLET(S): 5; 325 TABLET ORAL at 13:58

## 2018-02-18 RX ADMIN — CHLORHEXIDINE GLUCONATE 15 MILLILITER(S): 213 SOLUTION TOPICAL at 13:54

## 2018-02-18 RX ADMIN — AMPICILLIN SODIUM AND SULBACTAM SODIUM 200 GRAM(S): 250; 125 INJECTION, POWDER, FOR SUSPENSION INTRAMUSCULAR; INTRAVENOUS at 23:00

## 2018-02-18 RX ADMIN — AMPICILLIN SODIUM AND SULBACTAM SODIUM 200 GRAM(S): 250; 125 INJECTION, POWDER, FOR SUSPENSION INTRAMUSCULAR; INTRAVENOUS at 06:20

## 2018-02-18 RX ADMIN — OXYCODONE AND ACETAMINOPHEN 1 TABLET(S): 5; 325 TABLET ORAL at 23:18

## 2018-02-18 RX ADMIN — LISINOPRIL 10 MILLIGRAM(S): 2.5 TABLET ORAL at 06:19

## 2018-02-18 RX ADMIN — Medication 25 MILLIGRAM(S): at 06:19

## 2018-02-18 NOTE — PROGRESS NOTE ADULT - SUBJECTIVE AND OBJECTIVE BOX
ENT [facility] DAILY PROGRESS NOTE    Overnight events/Interval HPI:   67y Male with left digastric abscess s/p needle aspiration 2/12 2/13: CAMPBELL overnight. AFVSS. Tolerating PO. Pain well controlled. Exam stable. Denies dyspnea, CP, dysphagia, n/v, f/c/s. On unasyn for abx tx. WBC downtrending.   2/14: CAMPBELL. AFVSS. Patient with purulence expressible from prior needle sites so formal I&D performed with 3-4cc pus drained at bedside. Tolerated well. WBC downtrending. Tolerating PO, +odynophagia.   2/15: AFVSS. Patient with worsening dysphagia and coughing with drinking this AM. Repeat FFL showing worsening pharyngeal/supraglottic edema. No dyspnea or stridor/stertor. Tolerating secretions. Plan for STAT CT and transfer to SDU for continuous pulse ox. WBC continues to downtrend.   2/16: s/p urgent IR drainage of parapharyngeal space abscess, 1 neck drain placed. tolerated well. Voice improved this AM. Has not trialed PO. Tolerating secretions. Denies dyspnea, stridor/stertor. AFVSS.  2/17: CAMPBELL. AFVSS. Pain improved. Dysphagia improved. Tolerating soft diet. No dyspnea. Continues to drain thick purulent material from drain. WBC stable.   2/18: CAMPBELL.  AFVSS.  Pain improved, tolerating PO, nectar thick liquids.  persistent drainage from wound sites, NPL normalized yesterday, transferred to regular room          Allergies    No Known Allergies    Intolerances        PHYSICAL EXAM:  NAD, alert and appropriately responsive  nonlabored respirations on RA, no stridor/stertor, tolerating secretions  voice strong and clear  OC/OPx poor dentition, no mucosa lesions, FOM/tongue/buccal mucosa soft, no induration or swelling.   Neck erythema improved, 1cm area of residual induration around submental I&D site, mild purulence expressed, 1/4in plain gauze packing in place  L lateral neck soft flat, dressing in place, no ttp over angle of mandible   drain holding bulb suction with purulent material  avila ORNELAS       LABS:  CBC-                        13.0   7.5   )-----------( 286      ( 18 Feb 2018 07:29 )             39.2       Assessment/Plan:  67y Male with left soft tissue vs. intramuscular abscess s/p I&D 2/14 with worsening oropharyngeal/supraglottic edema on repeat FFL and development of large multifocal parapharyngeal abscess on repeat imaging s/p IR drainage 2/15. Improving, airway edema resolved.  -Continue unasyn for abx tx as initial culture strep viridans pansensitive   -f/u IR culture - NGTD  -Mech soft diet, advance to soft liquids  -Drain care  -daily dressing change  -warm compresses to site  -nicotine patch   -home meds  -up ad mary  -IS  -prn pain/nausea   -DVT ppx: SCDs, SQH  -[facility] at [phone] with any questions/concerns.      - d/w attending MD whom agrees with the above plan ENT [facility] DAILY PROGRESS NOTE    Overnight events/Interval HPI:   67y Male with left neck and submental abscess s/p needle aspiration 2/12 2/13: CAMPBELL overnight. AFVSS. Tolerating PO. Pain well controlled. Exam stable. Denies dyspnea, CP, dysphagia, n/v, f/c/s. On unasyn for abx tx. WBC downtrending.   2/14: CAMPBELL. AFVSS. Patient with purulence expressible from prior needle sites so formal I&D of submental area performed with 3-4cc pus drained at bedside. Tolerated well. WBC downtrending. Tolerating PO, +odynophagia.   2/15: AFVSS. Patient with worsening dysphagia and coughing with drinking this AM. Repeat FFL showing worsening pharyngeal/supraglottic edema. No dyspnea or stridor/stertor. Tolerating secretions. NECK CT and transfer to SDU for continuous pulse ox. WBC continues to downtrend. Had IR drainage of left parapharyngeal space collection (new) seen on neck CT scan.  2/16: s/p urgent IR drainage of left parapharyngeal space abscess, 1 neck drain placed. tolerated well. Voice improved this AM. Has not trialed PO. Tolerating secretions. Denies dyspnea, stridor/stertor. AFVSS.  2/17: CAMPBELL. AFVSS. Pain improved. Dysphagia improved. Tolerating soft diet. No dyspnea. Continues to drain thick purulent material from drain. WBC stable.   2/18: CAMPBELL.  AFVSS.  Pain improved, tolerating PO, nectar thick liquids.  persistent drainage from wound sites submental. laryngopharyngeal exam normalized yesterday, transferred to regular room.  Still on Unasyn.  Drainage about 30 ml.      Allergies  No Known Allergies    PHYSICAL EXAM:  NAD, alert and appropriately responsive  nonlabored respirations on RA, no stridor/stertor, tolerating secretions  voice strong and clear  OC/OPx poor dentition, no mucosa lesions, FOM/tongue/buccal mucosa soft, no induration or swelling.   Neck erythema improved, 1cm area of residual induration around submental I&D site, mild purulence expressed, 1/4in plain gauze packing in place  Left lateral neck soft flat, dressing in place, no ttp over angle of mandible   Drain holding bulb suction with purulent material  avila ORNELAS       LABS:  CBC-                        13.0   7.5   )-----------( 286      ( 18 Feb 2018 07:29 )             39.2     Micro:  Cx submental abscess -- strep viridans pansensitive   Cx from IR culture drainage -- NGTD      Assessment/Plan:  67y Male with left deep neck abscess s/p I&D submental 2/14 and s/p IR drainage left parapharyngeal space abscess 2/15.   Improving.  Airway edema resolved.  -Continue unasyn for abx tx as initial culture strep viridans pansensitive   -f/u IR culture - NGTD  -Mech soft diet, advance to soft liquids  -Drain care  -daily dressing change  -warm compresses to site  -nicotine patch   -home meds  -up ad mary  -IS  -prn pain/nausea   -DVT ppx: SCDs, SQH  -[facility] at [phone] with any questions/concerns.      - d/w attending MD whom agrees with the above plan

## 2018-02-19 LAB
-  CEFTRIAXONE: SIGNIFICANT CHANGE UP
-  CLINDAMYCIN: SIGNIFICANT CHANGE UP
-  ERYTHROMYCIN: SIGNIFICANT CHANGE UP
-  PENICILLIN: SIGNIFICANT CHANGE UP
-  VANCOMYCIN: SIGNIFICANT CHANGE UP
ANION GAP SERPL CALC-SCNC: 12 MMOL/L — SIGNIFICANT CHANGE UP (ref 5–17)
BUN SERPL-MCNC: 14 MG/DL — SIGNIFICANT CHANGE UP (ref 7–23)
CALCIUM SERPL-MCNC: 9.6 MG/DL — SIGNIFICANT CHANGE UP (ref 8.4–10.5)
CHLORIDE SERPL-SCNC: 96 MMOL/L — SIGNIFICANT CHANGE UP (ref 96–108)
CO2 SERPL-SCNC: 29 MMOL/L — SIGNIFICANT CHANGE UP (ref 22–31)
CREAT SERPL-MCNC: 0.65 MG/DL — SIGNIFICANT CHANGE UP (ref 0.5–1.3)
CULTURE RESULTS: SIGNIFICANT CHANGE UP
GLUCOSE SERPL-MCNC: 130 MG/DL — HIGH (ref 70–99)
METHOD TYPE: SIGNIFICANT CHANGE UP
METHOD TYPE: SIGNIFICANT CHANGE UP
ORGANISM # SPEC MICROSCOPIC CNT: SIGNIFICANT CHANGE UP
POTASSIUM SERPL-MCNC: 4.2 MMOL/L — SIGNIFICANT CHANGE UP (ref 3.5–5.3)
POTASSIUM SERPL-SCNC: 4.2 MMOL/L — SIGNIFICANT CHANGE UP (ref 3.5–5.3)
SODIUM SERPL-SCNC: 137 MMOL/L — SIGNIFICANT CHANGE UP (ref 135–145)
SPECIMEN SOURCE: SIGNIFICANT CHANGE UP

## 2018-02-19 PROCEDURE — 70491 CT SOFT TISSUE NECK W/DYE: CPT | Mod: 26

## 2018-02-19 PROCEDURE — 99233 SBSQ HOSP IP/OBS HIGH 50: CPT

## 2018-02-19 PROCEDURE — 93010 ELECTROCARDIOGRAM REPORT: CPT

## 2018-02-19 PROCEDURE — 99231 SBSQ HOSP IP/OBS SF/LOW 25: CPT

## 2018-02-19 RX ORDER — SODIUM CHLORIDE 9 MG/ML
1000 INJECTION INTRAMUSCULAR; INTRAVENOUS; SUBCUTANEOUS
Qty: 0 | Refills: 0 | Status: DISCONTINUED | OUTPATIENT
Start: 2018-02-20 | End: 2018-02-20

## 2018-02-19 RX ADMIN — CHLORHEXIDINE GLUCONATE 15 MILLILITER(S): 213 SOLUTION TOPICAL at 23:49

## 2018-02-19 RX ADMIN — HEPARIN SODIUM 5000 UNIT(S): 5000 INJECTION INTRAVENOUS; SUBCUTANEOUS at 06:00

## 2018-02-19 RX ADMIN — MORPHINE SULFATE 2 MILLIGRAM(S): 50 CAPSULE, EXTENDED RELEASE ORAL at 20:09

## 2018-02-19 RX ADMIN — OXYCODONE AND ACETAMINOPHEN 1 TABLET(S): 5; 325 TABLET ORAL at 18:16

## 2018-02-19 RX ADMIN — OXYCODONE AND ACETAMINOPHEN 1 TABLET(S): 5; 325 TABLET ORAL at 11:24

## 2018-02-19 RX ADMIN — OXYCODONE AND ACETAMINOPHEN 1 TABLET(S): 5; 325 TABLET ORAL at 19:21

## 2018-02-19 RX ADMIN — CHLORHEXIDINE GLUCONATE 15 MILLILITER(S): 213 SOLUTION TOPICAL at 05:13

## 2018-02-19 RX ADMIN — LISINOPRIL 10 MILLIGRAM(S): 2.5 TABLET ORAL at 05:13

## 2018-02-19 RX ADMIN — CHLORHEXIDINE GLUCONATE 15 MILLILITER(S): 213 SOLUTION TOPICAL at 18:16

## 2018-02-19 RX ADMIN — HEPARIN SODIUM 5000 UNIT(S): 5000 INJECTION INTRAVENOUS; SUBCUTANEOUS at 18:16

## 2018-02-19 RX ADMIN — OXYCODONE AND ACETAMINOPHEN 1 TABLET(S): 5; 325 TABLET ORAL at 23:55

## 2018-02-19 RX ADMIN — AMPICILLIN SODIUM AND SULBACTAM SODIUM 200 GRAM(S): 250; 125 INJECTION, POWDER, FOR SUSPENSION INTRAMUSCULAR; INTRAVENOUS at 10:17

## 2018-02-19 RX ADMIN — Medication 25 MILLIGRAM(S): at 05:14

## 2018-02-19 RX ADMIN — Medication 1 DROP(S): at 05:14

## 2018-02-19 RX ADMIN — AMPICILLIN SODIUM AND SULBACTAM SODIUM 200 GRAM(S): 250; 125 INJECTION, POWDER, FOR SUSPENSION INTRAMUSCULAR; INTRAVENOUS at 05:13

## 2018-02-19 RX ADMIN — Medication 1 DROP(S): at 18:16

## 2018-02-19 RX ADMIN — OXYCODONE AND ACETAMINOPHEN 1 TABLET(S): 5; 325 TABLET ORAL at 10:16

## 2018-02-19 RX ADMIN — MORPHINE SULFATE 2 MILLIGRAM(S): 50 CAPSULE, EXTENDED RELEASE ORAL at 20:30

## 2018-02-19 RX ADMIN — AMPICILLIN SODIUM AND SULBACTAM SODIUM 200 GRAM(S): 250; 125 INJECTION, POWDER, FOR SUSPENSION INTRAMUSCULAR; INTRAVENOUS at 23:49

## 2018-02-19 RX ADMIN — AMPICILLIN SODIUM AND SULBACTAM SODIUM 200 GRAM(S): 250; 125 INJECTION, POWDER, FOR SUSPENSION INTRAMUSCULAR; INTRAVENOUS at 18:16

## 2018-02-19 NOTE — PROGRESS NOTE ADULT - SUBJECTIVE AND OBJECTIVE BOX
ENT [facility] DAILY PROGRESS NOTE    Overnight events/Interval HPI:  67y Male with left digastric abscess s/p needle aspiration 2/12 2/13: CAMPBELL overnight. AFVSS. Tolerating PO. Pain well controlled. Exam stable. Denies dyspnea, CP, dysphagia, n/v, f/c/s. On unasyn for abx tx. WBC downtrending.   2/14: CAMPBELL. AFVSS. Patient with purulence expressible from prior needle sites so formal I&D performed with 3-4cc pus drained at bedside. Tolerated well. WBC downtrending. Tolerating PO, +odynophagia.   2/15: AFVSS. Patient with worsening dysphagia and coughing with drinking this AM. Repeat FFL showing worsening pharyngeal/supraglottic edema. No dyspnea or stridor/stertor. Tolerating secretions. Plan for STAT CT and transfer to SDU for continuous pulse ox. WBC continues to downtrend.   2/16: s/p urgent IR drainage of parapharyngeal space abscess, 1 neck drain placed. tolerated well. Voice improved this AM. Has not trialed PO. Tolerating secretions. Denies dyspnea, stridor/stertor. AFVSS.  2/17: CAMPBELL. AFVSS. Pain improved. Dysphagia improved. Tolerating soft diet. No dyspnea. Continues to drain thick purulent material from drain. WBC stable.   2/18: CAMPBELL.  AFVSS.  Pain improved, tolerating PO, nectar thick liquids.  persistent drainage from wound sites, NPL normalized yesterday, transferred to regular room  2/19: advanced to thin liquids which he tolerated well, pain controlled, persistent purulent drainage within drain and from I&D site, afebrile                MEDICATIONS:  Antiinfectives:   ampicillin/sulbactam  IVPB 3 Gram(s) IV Intermittent every 6 hours        Vital Signs Last 24 Hrs  T(C): 36.4 (19 Feb 2018 05:11), Max: 36.7 (18 Feb 2018 16:17)  T(F): 97.5 (19 Feb 2018 05:11), Max: 98.1 (18 Feb 2018 16:17)  HR: 49 (19 Feb 2018 05:11) (49 - 62)  BP: 145/85 (19 Feb 2018 05:11) (104/67 - 145/85)  BP(mean): --  RR: 16 (19 Feb 2018 05:11) (16 - 17)  SpO2: 97% (19 Feb 2018 05:11) (94% - 97%)            PHYSICAL EXAM:  NAD, alert and appropriately responsive  nonlabored respirations on RA, no stridor/stertor, tolerating secretions  voice strong and clear  OC/OPx poor dentition, no mucosal lesions, FOM/tongue/buccal mucosa soft, no induration or swelling.   Neck erythema improved, 1cm area of residual induration and erythema around submental I&D site, persistent purulence expressed, 1/4in plain gauze packing in place  L lateral neck soft flat, dressing in place, no ttp over angle of mandible   drain holding bulb suction with purulent material  avila ORNELAS       LABS:  no new labs    Assessment/Plan:     67y Male with left soft tissue vs. intramuscular abscess s/p I&D 2/14 with worsening oropharyngeal/supraglottic edema on repeat FFL and development of large multifocal parapharyngeal abscess on repeat imaging s/p IR drainage 2/15. Improving, airway edema resolved.  -Continue unasyn for abx tx as initial culture strep viridans pansensitive   -f/u IR culture - st. milleri, viridans  -Mech soft diet, clear liquids  -Drain care  -daily dressing change  -warm compresses to site  -nicotine patch   -home meds  -oob ad mary  -IS  -prn pain/nausea   -DVT ppx: SCDs, SQH  -[facility] at [phone] with any questions/concerns.      - d/w attending MD whom agrees with the above plan ENT [facility] DAILY PROGRESS NOTE    Overnight events/Interval HPI:  67y Male with submental and left parapharyngeal abscess     2/12: admission via ED.  s/p needle aspiration of submental abscess --> 1.5 ml pus.  WBC high, left shift  2/13: CAMPBELL overnight. AFVSS. Tolerating PO. Pain well controlled. Exam stable. Denies dyspnea, CP, dysphagia, n/v, f/c/s. On unasyn for abx tx. WBC downtrending.   2/14: CAMPBELL. AFVSS. Patient with purulence expressible from prior needle sites so formal I&D submental abscess performed with 3-4cc pus drained at bedside. Tolerated well. WBC downtrending. Tolerating PO, +odynophagia.   2/15: AFVSS. Patient with worsening dysphagia and coughing with drinking this AM. Repeat FFL showing worsening pharyngeal/supraglottic edema. No dyspnea or stridor/stertor. Tolerating secretions. NECK CT done and transfer to SDU for continuous pulse ox. WBC continues to downtrend.  Had urgent IR drainage of parapharyngeal space abscess after neck collection seen on CT.  2/16: s/p urgent IR drainage of parapharyngeal space abscess, 1 neck drain placed. tolerated well. Voice improved this AM. Has not trialed PO. Tolerating secretions. Denies dyspnea, stridor/stertor. AFVSS.  2/17: CAMPBELL. AFVSS. Pain improved. Dysphagia improved. Tolerating soft diet. No dyspnea. Continues to drain thick purulent material from drain. WBC stable.   2/18: CAMPBELL.  AFVSS.  Pain improved, tolerating PO, nectar thick liquids.  Persistent drainage from wound sites, FOL exam normalized yesterday, transferred to regular room  2/19: advanced to thin liquids which he tolerated well, pain controlled, persistent purulent drainage through IR drain (at least 30 ml) and from I&D site, afebrile        MEDICATIONS:  Antiinfectives:   ampicillin/sulbactam  IVPB 3 Gram(s) IV Intermittent every 6 hours      Vital Signs Last 24 Hrs  T(C): 36.4 (19 Feb 2018 05:11), Max: 36.7 (18 Feb 2018 16:17)  T(F): 97.5 (19 Feb 2018 05:11), Max: 98.1 (18 Feb 2018 16:17)  HR: 49 (19 Feb 2018 05:11) (49 - 62)  BP: 145/85 (19 Feb 2018 05:11) (104/67 - 145/85)  RR: 16 (19 Feb 2018 05:11) (16 - 17)  SpO2: 97% (19 Feb 2018 05:11) (94% - 97%)        PHYSICAL EXAM:  NAD, alert and appropriately responsive  nonlabored respirations on RA, no stridor/stertor, tolerating secretions  voice strong and clear  OC/OPx poor dentition, no mucosal lesions, FOM/tongue/buccal mucosa soft, no induration or swelling.   Neck erythema improved, 1cm area of residual induration and erythema around submental I&D site, persistent purulence expressed, 1/4in plain gauze packing in place  Left lateral neck soft flat, dressing in place, no ttp over angle of mandible   Drain holding bulb suction with purulent material  avila ORNELAS       LABS:  no new labs    MICRO:  IR culture -- strep milleri, viridans    Assessment/Plan:     67y Male with left parapharyngeal s/p IR draine 2/15 and submental abscesses s/p I&D 2/14 continues to have lot of drainage from both sites  -Continue unasyn for abx tx as initial culture strep viridans pansensitive   -Mech soft diet, clear liquids  -Drain care  -daily dressing change  -warm compresses to site  -nicotine patch   -home meds  -oob ad mary  -IS  -prn pain/nausea   -DVT ppx: SCDs, SQH  -[facility] at [phone] with any questions/concerns.    - d/w attending MD whom agrees with the above plan

## 2018-02-19 NOTE — PROGRESS NOTE ADULT - SUBJECTIVE AND OBJECTIVE BOX
Mr. Marcum had another CT neck done today, since persistent drainage from both neck sites.      CT NECK with contrast (02/19/2018)    The current study is compared with previous scan of 2/15/2018.    There has been interval placement of a surgical drain that enters along the anterior margin of the left sternocleidomastoid muscle and terminates   in the left oropharyngeal parapharyngeal space. This drain is noted to traverse the most superior aspect of the previously identified   parapharyngeal and retropharyngeal component of a multispatial abscess, with marked reduction in volume of fluid within this component.   Persistent fluid is identified tracking along the posterior margin of the left thyroid cartilage, findings well seen on axial images 59 through 62.   There has been reduction in mass effect on the airway with persistent but diminished epiglottic and aryepiglottic fold edema noted.   The left submental component of the multiple spatial collection has slightly increased in size and now demonstrates a well demarcated enhancing rim.   Components within the left posterior belly of the digastric and parotid gland persist with similar improved definition of the abscess capsule,   though each is slightly diminished in volume on comparison with the prior study.   There is now a well demarcated peripherally enhancing collection extending caudally in the ventral visceral space within the right-sided   strap muscles, at site at previously described phlegmon. This again is noted to terminate at the sternal notch, and there is no evidence of   extension of either prelaryngeal or retropharyngeal collections into the mediastinum.    The vascular structures remain unremarkable in appearance. There are again noted multiple reactive appearing lymph nodes in the left areolar   right neck without suppurative change. The lung apices remain clear.    IMPRESSION:  Status post incision and drainage of multi spatial abscess with indwelling drain, as above. While there has been considerable diminution   in volume of parapharyngeal space component of the collection, and in associated airway mass effect and edema, persistent fluid collections are   noted at other sites, including within the right-sided strap musculature, as above.  (images were reviewed with radiologist)      Findings were discussed with Mr. Marcum, and recommendation was made for I&D of collections in his neck  Risks and benefits, along with alternatives were discussed wtih him.  Risks include, but not limited to, bleeding, infection, left lower lip weakness, voice change and scar.  Will add on to schedule tomorrow.

## 2018-02-20 LAB
APTT BLD: 29.9 SEC — SIGNIFICANT CHANGE UP (ref 27.5–37.4)
HCT VFR BLD CALC: 41 % — SIGNIFICANT CHANGE UP (ref 39–50)
HGB BLD-MCNC: 13.9 G/DL — SIGNIFICANT CHANGE UP (ref 13–17)
INR BLD: 1.04 — SIGNIFICANT CHANGE UP (ref 0.88–1.16)
MCHC RBC-ENTMCNC: 31.8 PG — SIGNIFICANT CHANGE UP (ref 27–34)
MCHC RBC-ENTMCNC: 33.9 G/DL — SIGNIFICANT CHANGE UP (ref 32–36)
MCV RBC AUTO: 93.8 FL — SIGNIFICANT CHANGE UP (ref 80–100)
PLATELET # BLD AUTO: 255 K/UL — SIGNIFICANT CHANGE UP (ref 150–400)
PROTHROM AB SERPL-ACNC: 11.5 SEC — SIGNIFICANT CHANGE UP (ref 9.8–12.7)
RBC # BLD: 4.37 M/UL — SIGNIFICANT CHANGE UP (ref 4.2–5.8)
RBC # FLD: 12.1 % — SIGNIFICANT CHANGE UP (ref 10.3–16.9)
WBC # BLD: 9 K/UL — SIGNIFICANT CHANGE UP (ref 3.8–10.5)
WBC # FLD AUTO: 9 K/UL — SIGNIFICANT CHANGE UP (ref 3.8–10.5)

## 2018-02-20 PROCEDURE — 99232 SBSQ HOSP IP/OBS MODERATE 35: CPT | Mod: 57

## 2018-02-20 RX ORDER — SODIUM CHLORIDE 9 MG/ML
1000 INJECTION, SOLUTION INTRAVENOUS
Qty: 0 | Refills: 0 | Status: DISCONTINUED | OUTPATIENT
Start: 2018-02-20 | End: 2018-02-21

## 2018-02-20 RX ADMIN — AMPICILLIN SODIUM AND SULBACTAM SODIUM 200 GRAM(S): 250; 125 INJECTION, POWDER, FOR SUSPENSION INTRAMUSCULAR; INTRAVENOUS at 11:33

## 2018-02-20 RX ADMIN — OXYCODONE AND ACETAMINOPHEN 1 TABLET(S): 5; 325 TABLET ORAL at 10:44

## 2018-02-20 RX ADMIN — LISINOPRIL 10 MILLIGRAM(S): 2.5 TABLET ORAL at 06:01

## 2018-02-20 RX ADMIN — HEPARIN SODIUM 5000 UNIT(S): 5000 INJECTION INTRAVENOUS; SUBCUTANEOUS at 06:01

## 2018-02-20 RX ADMIN — OXYCODONE AND ACETAMINOPHEN 1 TABLET(S): 5; 325 TABLET ORAL at 00:25

## 2018-02-20 RX ADMIN — CHLORHEXIDINE GLUCONATE 15 MILLILITER(S): 213 SOLUTION TOPICAL at 06:01

## 2018-02-20 RX ADMIN — Medication 1 DROP(S): at 17:25

## 2018-02-20 RX ADMIN — AMPICILLIN SODIUM AND SULBACTAM SODIUM 200 GRAM(S): 250; 125 INJECTION, POWDER, FOR SUSPENSION INTRAMUSCULAR; INTRAVENOUS at 06:00

## 2018-02-20 RX ADMIN — AMPICILLIN SODIUM AND SULBACTAM SODIUM 200 GRAM(S): 250; 125 INJECTION, POWDER, FOR SUSPENSION INTRAMUSCULAR; INTRAVENOUS at 17:25

## 2018-02-20 RX ADMIN — Medication 25 MILLIGRAM(S): at 06:01

## 2018-02-20 RX ADMIN — CHLORHEXIDINE GLUCONATE 15 MILLILITER(S): 213 SOLUTION TOPICAL at 21:06

## 2018-02-20 RX ADMIN — OXYCODONE AND ACETAMINOPHEN 1 TABLET(S): 5; 325 TABLET ORAL at 06:01

## 2018-02-20 RX ADMIN — OXYCODONE AND ACETAMINOPHEN 1 TABLET(S): 5; 325 TABLET ORAL at 21:10

## 2018-02-20 RX ADMIN — OXYCODONE AND ACETAMINOPHEN 1 TABLET(S): 5; 325 TABLET ORAL at 20:07

## 2018-02-20 RX ADMIN — OXYCODONE AND ACETAMINOPHEN 1 TABLET(S): 5; 325 TABLET ORAL at 14:54

## 2018-02-20 RX ADMIN — OXYCODONE AND ACETAMINOPHEN 1 TABLET(S): 5; 325 TABLET ORAL at 07:00

## 2018-02-20 RX ADMIN — Medication 1 DROP(S): at 06:00

## 2018-02-20 RX ADMIN — OXYCODONE AND ACETAMINOPHEN 1 TABLET(S): 5; 325 TABLET ORAL at 15:24

## 2018-02-20 RX ADMIN — OXYCODONE AND ACETAMINOPHEN 1 TABLET(S): 5; 325 TABLET ORAL at 10:14

## 2018-02-20 RX ADMIN — CHLORHEXIDINE GLUCONATE 15 MILLILITER(S): 213 SOLUTION TOPICAL at 14:54

## 2018-02-20 RX ADMIN — SODIUM CHLORIDE 100 MILLILITER(S): 9 INJECTION INTRAMUSCULAR; INTRAVENOUS; SUBCUTANEOUS at 00:01

## 2018-02-20 NOTE — PROGRESS NOTE ADULT - SUBJECTIVE AND OBJECTIVE BOX
Pre-op note:    Pre-Op Diagnosis: neck abscess  Planned Procedure and Scheduled Time: incision and drainage of necks abscess 2/20/18 A/O  Indication: persistent fluid collections s/p IR drainage and bedside I&D  Labs/studies:    9.0>13.9/41.0<255  137/4.2/96/29/14/0.65<130  Ca: 9.6  PT/INR 11.5/1.04 PTT 29.9    Official CXR reading: No acute pathology  Official EKG reading: Sinus rhythm with 1st degree AV block  Type and Screen active  NPO after MN  IVF ordered after MN  Antibiotics: Unasyn  Anesthesia evaluation: pending  Operative Consent: in chart

## 2018-02-20 NOTE — PROGRESS NOTE ADULT - SUBJECTIVE AND OBJECTIVE BOX
66 y/o M s/p neck abscess drainage tube placement. No complaints.  Drain output: 35cc/last 24 hours as of this morning. Purulent.  Flushed with 4cc NS.

## 2018-02-20 NOTE — CHART NOTE - NSCHARTNOTEFT_GEN_A_CORE
Admitting Diagnosis: 67y Male with left soft tissue vs. intramuscular abscess s/p I&D 2/14 with worsening oropharyngeal/supraglottic edema on repeat FFL and development of large multifocal parapharyngeal abscess on repeat imaging s/p IR drainage 2/15. Improving, airway edema resolved.    PAST MEDICAL & SURGICAL HISTORY:  Glaucoma  HTN (hypertension)  No significant past surgical history    Current Nutrition Order: NPO for OR    PO Intake: Good (%) [   ]  Fair (50-75%) [   ] Poor (<25%) [   ] - States prior to NPO pt tolerating 100% of meals.     GI Issues: No n/v/d/c reported     Pain: None reported     Skin Integrity: Surgical incision    Labs:   02-19    137  |  96  |  14  ----------------------------<  130<H>  4.2   |  29  |  0.65    Ca    9.6      19 Feb 2018 12:32    Medications:  MEDICATIONS  (STANDING):  ampicillin/sulbactam  IVPB 3 Gram(s) IV Intermittent every 6 hours  chlorhexidine 0.12% Liquid 15 milliLiter(s) Swish and Spit three times a day  heparin  Injectable 5000 Unit(s) SubCutaneous every 12 hours  hydrochlorothiazide 25 milliGRAM(s) Oral daily  lisinopril 10 milliGRAM(s) Oral daily  nicotine -   7 mG/24Hr(s) Patch 1 patch Transdermal daily  sodium chloride 0.9%. 1000 milliLiter(s) (100 mL/Hr) IV Continuous <Continuous>  timolol 0.5% Solution 1 Drop(s) Both EYES two times a day    MEDICATIONS  (PRN):  acetaminophen   Tablet. 650 milliGRAM(s) Oral every 6 hours PRN Mild Pain (1 - 3)  morphine  - Injectable 2 milliGRAM(s) IV Push every 4 hours PRN Severe Pain (7 - 10)  ondansetron Injectable 4 milliGRAM(s) IV Push every 6 hours PRN Nausea and/or Vomiting  oxyCODONE    5 mG/acetaminophen 325 mG 1 Tablet(s) Oral every 4 hours PRN Moderate Pain (4 - 6)    Weight:  No new weights to assess    Estimated energy needs using 64 kg IBW:  IBW used due to pt > 120% of IBW.  Needs 2* procedure 2/20  25-30 kcal/kg (9647-5379 kcal).   1-1.2 g/kg (64-77 g protein).   30-35 mL/kg (8880-0979 mL fluid).      Subjective: Pt reports a good appetite prior to NPO status for procedure. Requesting "prime rib" once surgery is over. No GI distress & no difficulty chewing/swallowing reported.     Previous Nutrition Diagnosis:  Inadequate oral intake RT difficulty chewing/swallowing 2/2 L digastric abscess AEB pt reporting poor PO intake <50% meals and potential 10lb wt loss per bedscale    Active [   ]  Resolved [ X  ]    If resolved, new PES: Inadequate oral intake RT Inability to take PO AEB NPO for procedure    Goal: Initiate nutrition within 12-24 hours    Recommendations:  1. Restart Regular diet as feasible. Continue to encourage PO intake  2. Trend weights weekly    Education: Adequate oral intake for healing    Risk Level: High [ X  ] Moderate [   ] Low [   ]

## 2018-02-21 ENCOUNTER — RESULT REVIEW (OUTPATIENT)
Age: 68
End: 2018-02-21

## 2018-02-21 LAB
GRAM STN FLD: SIGNIFICANT CHANGE UP
SPECIMEN SOURCE: SIGNIFICANT CHANGE UP

## 2018-02-21 PROCEDURE — 21501 I&D DP ABSC/HMTMA SFT TS NCK: CPT

## 2018-02-21 PROCEDURE — 38510 BIOPSY/REMOVAL LYMPH NODES: CPT | Mod: LT

## 2018-02-21 RX ORDER — SODIUM CHLORIDE 9 MG/ML
1000 INJECTION INTRAMUSCULAR; INTRAVENOUS; SUBCUTANEOUS
Qty: 0 | Refills: 0 | Status: DISCONTINUED | OUTPATIENT
Start: 2018-02-21 | End: 2018-02-21

## 2018-02-21 RX ORDER — HEPARIN SODIUM 5000 [USP'U]/ML
5000 INJECTION INTRAVENOUS; SUBCUTANEOUS EVERY 12 HOURS
Qty: 0 | Refills: 0 | Status: DISCONTINUED | OUTPATIENT
Start: 2018-02-21 | End: 2018-02-21

## 2018-02-21 RX ORDER — HYDROCHLOROTHIAZIDE 25 MG
25 TABLET ORAL DAILY
Qty: 0 | Refills: 0 | Status: DISCONTINUED | OUTPATIENT
Start: 2018-02-21 | End: 2018-02-24

## 2018-02-21 RX ORDER — MORPHINE SULFATE 50 MG/1
2 CAPSULE, EXTENDED RELEASE ORAL EVERY 4 HOURS
Qty: 0 | Refills: 0 | Status: DISCONTINUED | OUTPATIENT
Start: 2018-02-21 | End: 2018-02-24

## 2018-02-21 RX ORDER — OXYCODONE AND ACETAMINOPHEN 5; 325 MG/1; MG/1
1 TABLET ORAL EVERY 4 HOURS
Qty: 0 | Refills: 0 | Status: DISCONTINUED | OUTPATIENT
Start: 2018-02-21 | End: 2018-02-21

## 2018-02-21 RX ORDER — NICOTINE POLACRILEX 2 MG
1 GUM BUCCAL DAILY
Qty: 0 | Refills: 0 | Status: DISCONTINUED | OUTPATIENT
Start: 2018-02-21 | End: 2018-02-24

## 2018-02-21 RX ORDER — ACETAMINOPHEN 500 MG
650 TABLET ORAL EVERY 6 HOURS
Qty: 0 | Refills: 0 | Status: DISCONTINUED | OUTPATIENT
Start: 2018-02-21 | End: 2018-02-24

## 2018-02-21 RX ORDER — OXYCODONE AND ACETAMINOPHEN 5; 325 MG/1; MG/1
1 TABLET ORAL EVERY 4 HOURS
Qty: 0 | Refills: 0 | Status: DISCONTINUED | OUTPATIENT
Start: 2018-02-21 | End: 2018-02-24

## 2018-02-21 RX ORDER — LISINOPRIL 2.5 MG/1
10 TABLET ORAL DAILY
Qty: 0 | Refills: 0 | Status: DISCONTINUED | OUTPATIENT
Start: 2018-02-21 | End: 2018-02-24

## 2018-02-21 RX ORDER — AMPICILLIN SODIUM AND SULBACTAM SODIUM 250; 125 MG/ML; MG/ML
3 INJECTION, POWDER, FOR SUSPENSION INTRAMUSCULAR; INTRAVENOUS EVERY 6 HOURS
Qty: 0 | Refills: 0 | Status: DISCONTINUED | OUTPATIENT
Start: 2018-02-21 | End: 2018-02-24

## 2018-02-21 RX ORDER — IBUPROFEN 200 MG
400 TABLET ORAL EVERY 6 HOURS
Qty: 0 | Refills: 0 | Status: DISCONTINUED | OUTPATIENT
Start: 2018-02-21 | End: 2018-02-24

## 2018-02-21 RX ORDER — CHLORHEXIDINE GLUCONATE 213 G/1000ML
15 SOLUTION TOPICAL THREE TIMES A DAY
Qty: 0 | Refills: 0 | Status: DISCONTINUED | OUTPATIENT
Start: 2018-02-21 | End: 2018-02-24

## 2018-02-21 RX ORDER — SODIUM CHLORIDE 9 MG/ML
1000 INJECTION INTRAMUSCULAR; INTRAVENOUS; SUBCUTANEOUS
Qty: 0 | Refills: 0 | Status: DISCONTINUED | OUTPATIENT
Start: 2018-02-21 | End: 2018-02-23

## 2018-02-21 RX ORDER — TIMOLOL 0.5 %
1 DROPS OPHTHALMIC (EYE)
Qty: 0 | Refills: 0 | Status: DISCONTINUED | OUTPATIENT
Start: 2018-02-21 | End: 2018-02-24

## 2018-02-21 RX ORDER — HEPARIN SODIUM 5000 [USP'U]/ML
5000 INJECTION INTRAVENOUS; SUBCUTANEOUS EVERY 8 HOURS
Qty: 0 | Refills: 0 | Status: DISCONTINUED | OUTPATIENT
Start: 2018-02-21 | End: 2018-02-24

## 2018-02-21 RX ORDER — ONDANSETRON 8 MG/1
4 TABLET, FILM COATED ORAL EVERY 6 HOURS
Qty: 0 | Refills: 0 | Status: DISCONTINUED | OUTPATIENT
Start: 2018-02-21 | End: 2018-02-24

## 2018-02-21 RX ADMIN — HEPARIN SODIUM 5000 UNIT(S): 5000 INJECTION INTRAVENOUS; SUBCUTANEOUS at 22:08

## 2018-02-21 RX ADMIN — CHLORHEXIDINE GLUCONATE 15 MILLILITER(S): 213 SOLUTION TOPICAL at 22:08

## 2018-02-21 RX ADMIN — CHLORHEXIDINE GLUCONATE 15 MILLILITER(S): 213 SOLUTION TOPICAL at 15:19

## 2018-02-21 RX ADMIN — CHLORHEXIDINE GLUCONATE 15 MILLILITER(S): 213 SOLUTION TOPICAL at 05:37

## 2018-02-21 RX ADMIN — AMPICILLIN SODIUM AND SULBACTAM SODIUM 200 GRAM(S): 250; 125 INJECTION, POWDER, FOR SUSPENSION INTRAMUSCULAR; INTRAVENOUS at 00:00

## 2018-02-21 RX ADMIN — AMPICILLIN SODIUM AND SULBACTAM SODIUM 200 GRAM(S): 250; 125 INJECTION, POWDER, FOR SUSPENSION INTRAMUSCULAR; INTRAVENOUS at 23:23

## 2018-02-21 RX ADMIN — SODIUM CHLORIDE 75 MILLILITER(S): 9 INJECTION INTRAMUSCULAR; INTRAVENOUS; SUBCUTANEOUS at 12:20

## 2018-02-21 RX ADMIN — OXYCODONE AND ACETAMINOPHEN 1 TABLET(S): 5; 325 TABLET ORAL at 05:36

## 2018-02-21 RX ADMIN — OXYCODONE AND ACETAMINOPHEN 1 TABLET(S): 5; 325 TABLET ORAL at 00:00

## 2018-02-21 RX ADMIN — Medication 1 DROP(S): at 17:17

## 2018-02-21 RX ADMIN — SODIUM CHLORIDE 100 MILLILITER(S): 9 INJECTION INTRAMUSCULAR; INTRAVENOUS; SUBCUTANEOUS at 23:23

## 2018-02-21 RX ADMIN — MORPHINE SULFATE 2 MILLIGRAM(S): 50 CAPSULE, EXTENDED RELEASE ORAL at 22:18

## 2018-02-21 RX ADMIN — HEPARIN SODIUM 5000 UNIT(S): 5000 INJECTION INTRAVENOUS; SUBCUTANEOUS at 05:36

## 2018-02-21 RX ADMIN — OXYCODONE AND ACETAMINOPHEN 1 TABLET(S): 5; 325 TABLET ORAL at 13:15

## 2018-02-21 RX ADMIN — AMPICILLIN SODIUM AND SULBACTAM SODIUM 200 GRAM(S): 250; 125 INJECTION, POWDER, FOR SUSPENSION INTRAMUSCULAR; INTRAVENOUS at 05:36

## 2018-02-21 RX ADMIN — AMPICILLIN SODIUM AND SULBACTAM SODIUM 200 GRAM(S): 250; 125 INJECTION, POWDER, FOR SUSPENSION INTRAMUSCULAR; INTRAVENOUS at 17:17

## 2018-02-21 RX ADMIN — OXYCODONE AND ACETAMINOPHEN 1 TABLET(S): 5; 325 TABLET ORAL at 13:45

## 2018-02-21 RX ADMIN — OXYCODONE AND ACETAMINOPHEN 1 TABLET(S): 5; 325 TABLET ORAL at 01:00

## 2018-02-21 RX ADMIN — OXYCODONE AND ACETAMINOPHEN 1 TABLET(S): 5; 325 TABLET ORAL at 06:28

## 2018-02-21 RX ADMIN — AMPICILLIN SODIUM AND SULBACTAM SODIUM 200 GRAM(S): 250; 125 INJECTION, POWDER, FOR SUSPENSION INTRAMUSCULAR; INTRAVENOUS at 12:23

## 2018-02-21 RX ADMIN — MORPHINE SULFATE 2 MILLIGRAM(S): 50 CAPSULE, EXTENDED RELEASE ORAL at 22:08

## 2018-02-21 RX ADMIN — Medication 1 DROP(S): at 05:37

## 2018-02-21 NOTE — BRIEF OPERATIVE NOTE - PRE-OP DX
Encounter for incision and drainage procedure  02/21/2018    Active  Kika Hodges Neck abscess  02/21/2018    Active  Lynn Morton

## 2018-02-21 NOTE — BRIEF OPERATIVE NOTE - POST-OP DX
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

## 2018-02-21 NOTE — BRIEF OPERATIVE NOTE - OPERATION/FINDINGS
Submental I&D site extended. midline and left cervical incision made to access parapharyngeal and anterior neck abscess. Left level 3 lymph node removed and sent for permanent. 2x penrose placed.     4 lower cental incisors removed. Submental I&D site extended.   Midline and left cervical incision made to access left parapharyngeal and anterior deep neck abscesses.   Left level 3 lymph node removed and sent for permanent.   4 lower cental incisors removed.  Penrose 1/4-inch drains placed x 2

## 2018-02-21 NOTE — BRIEF OPERATIVE NOTE - PROCEDURE
<<-----Click on this checkbox to enter Procedure Incision and drainage abscess  02/21/2018    Active  [id number] Lymph node excision  02/21/2018  Left level 3  Active  [id number]  Incision and drainage abscess  02/21/2018  left parapharyngeal space  Active  Kika Hodges  Incision and drainage of abscess  02/14/2018  anterior neck  Active  Gabriela Day

## 2018-02-21 NOTE — PROGRESS NOTE ADULT - SUBJECTIVE AND OBJECTIVE BOX
Overnight events/Interval HPI:  67y Male with left digastric abscess s/p needle aspiration 2/12 2/13: CAMPBELL overnight. AFVSS. Tolerating PO. Pain well controlled. Exam stable. Denies dyspnea, CP, dysphagia, n/v, f/c/s. On unasyn for abx tx. WBC downtrending.   2/14: CAMPBELL. AFVSS. Patient with purulence expressible from prior needle sites so formal I&D performed with 3-4cc pus drained at bedside. Tolerated well. WBC downtrending. Tolerating PO, +odynophagia.   2/15: AFVSS. Patient with worsening dysphagia and coughing with drinking this AM. Repeat FFL showing worsening pharyngeal/supraglottic edema. No dyspnea or stridor/stertor. Tolerating secretions. Plan for STAT CT and transfer to SDU for continuous pulse ox. WBC continues to downtrend.   2/16: s/p urgent IR drainage of parapharyngeal space abscess, 1 neck drain placed. tolerated well. Voice improved this AM. Has not trialed PO. Tolerating secretions. Denies dyspnea, stridor/stertor. AFVSS.  2/17: CAMPBELL. AFVSS. Pain improved. Dysphagia improved. Tolerating soft diet. No dyspnea. Continues to drain thick purulent material from drain. WBC stable.   2/18: CAMPBELL.  AFVSS.  Pain improved, tolerating PO, nectar thick liquids.  persistent drainage from wound sites, NPL normalized yesterday, transferred to regular room  2/19: advanced to thin liquids which he tolerated well, pain controlled, persistent purulent drainage within drain and from I&D site, afebrile  2/20: Well overnight. pain improved. tolerating PO.   2/21: taken to OR for I&D + washout and extraction of teeth. Procedure well tolerated. Pain controlled. Tolerating soft diet. ambulating     Vital Signs Last 24 Hrs  T(C): 36.5 (21 Feb 2018 12:05), Max: 37.3 (20 Feb 2018 20:29)  T(F): 97.7 (21 Feb 2018 12:05), Max: 99.2 (20 Feb 2018 20:29)  HR: 62 (21 Feb 2018 12:05) (50 - 63)  BP: 113/76 (21 Feb 2018 12:05) (93/64 - 144/67)  BP(mean): 87 (21 Feb 2018 11:45) (72 - 103)  RR: 16 (21 Feb 2018 12:05) (14 - 17)  SpO2: 97% (21 Feb 2018 12:05) (96% - 98%)    PHYSICAL EXAM:  NAD, alert and appropriately responsive  nonlabored respirations on RA, no stridor/stertor, tolerating secretions  voice strong and clear  OC/OPx poor dentition, no mucosal lesions, FOM/tongue/buccal mucosa soft, no induration or swelling.   Neck erythema improved, 1cm area of residual induration and erythema around I&D site stented open with 2x penrose, persistent SS expressed  L lateral neck soft flat, dressing in place, no ttp over angle of mandible   Oral cavity unremarkable. no bleeding  avila ORNELAS     Assessment/Plan:     67y Male with left soft tissue vs. intramuscular abscess s/p I&D 2/14 with worsening oropharyngeal/supraglottic edema on repeat FFL and development of large multifocal parapharyngeal abscess on repeat imaging s/p IR drainage 2/15. S/P formal OR I&D and washout and teeth extraion. Continues to improve, airway edema resolved.  -Continue unasyn for abx tx as initial culture strep viridans pansensitive   -f/u OR culture   -Mech soft diet, clear liquids  -daily dressing change  -nicotine patch   -home meds  -oob ad mary  -IS  -prn pain/nausea   -DVT ppx: Stephenie, SQH  -[facility] at [phone] with any questions/concerns.

## 2018-02-22 LAB
BASOPHILS NFR BLD AUTO: 0.1 % — SIGNIFICANT CHANGE UP (ref 0–2)
EOSINOPHIL NFR BLD AUTO: 1.2 % — SIGNIFICANT CHANGE UP (ref 0–6)
HCT VFR BLD CALC: 38.1 % — LOW (ref 39–50)
HGB BLD-MCNC: 12.7 G/DL — LOW (ref 13–17)
LYMPHOCYTES # BLD AUTO: 25.8 % — SIGNIFICANT CHANGE UP (ref 13–44)
MCHC RBC-ENTMCNC: 31.7 PG — SIGNIFICANT CHANGE UP (ref 27–34)
MCHC RBC-ENTMCNC: 33.3 G/DL — SIGNIFICANT CHANGE UP (ref 32–36)
MCV RBC AUTO: 95 FL — SIGNIFICANT CHANGE UP (ref 80–100)
MONOCYTES NFR BLD AUTO: 11.3 % — SIGNIFICANT CHANGE UP (ref 2–14)
NEUTROPHILS NFR BLD AUTO: 61.6 % — SIGNIFICANT CHANGE UP (ref 43–77)
PLATELET # BLD AUTO: 210 K/UL — SIGNIFICANT CHANGE UP (ref 150–400)
RBC # BLD: 4.01 M/UL — LOW (ref 4.2–5.8)
RBC # FLD: 12.2 % — SIGNIFICANT CHANGE UP (ref 10.3–16.9)
WBC # BLD: 11.3 K/UL — HIGH (ref 3.8–10.5)
WBC # FLD AUTO: 11.3 K/UL — HIGH (ref 3.8–10.5)

## 2018-02-22 RX ADMIN — CHLORHEXIDINE GLUCONATE 15 MILLILITER(S): 213 SOLUTION TOPICAL at 05:21

## 2018-02-22 RX ADMIN — AMPICILLIN SODIUM AND SULBACTAM SODIUM 200 GRAM(S): 250; 125 INJECTION, POWDER, FOR SUSPENSION INTRAMUSCULAR; INTRAVENOUS at 05:21

## 2018-02-22 RX ADMIN — OXYCODONE AND ACETAMINOPHEN 1 TABLET(S): 5; 325 TABLET ORAL at 22:37

## 2018-02-22 RX ADMIN — CHLORHEXIDINE GLUCONATE 15 MILLILITER(S): 213 SOLUTION TOPICAL at 13:21

## 2018-02-22 RX ADMIN — HEPARIN SODIUM 5000 UNIT(S): 5000 INJECTION INTRAVENOUS; SUBCUTANEOUS at 13:22

## 2018-02-22 RX ADMIN — AMPICILLIN SODIUM AND SULBACTAM SODIUM 200 GRAM(S): 250; 125 INJECTION, POWDER, FOR SUSPENSION INTRAMUSCULAR; INTRAVENOUS at 17:33

## 2018-02-22 RX ADMIN — AMPICILLIN SODIUM AND SULBACTAM SODIUM 200 GRAM(S): 250; 125 INJECTION, POWDER, FOR SUSPENSION INTRAMUSCULAR; INTRAVENOUS at 11:25

## 2018-02-22 RX ADMIN — OXYCODONE AND ACETAMINOPHEN 1 TABLET(S): 5; 325 TABLET ORAL at 21:37

## 2018-02-22 RX ADMIN — CHLORHEXIDINE GLUCONATE 15 MILLILITER(S): 213 SOLUTION TOPICAL at 21:36

## 2018-02-22 RX ADMIN — OXYCODONE AND ACETAMINOPHEN 1 TABLET(S): 5; 325 TABLET ORAL at 06:23

## 2018-02-22 RX ADMIN — Medication 1 DROP(S): at 17:33

## 2018-02-22 RX ADMIN — OXYCODONE AND ACETAMINOPHEN 1 TABLET(S): 5; 325 TABLET ORAL at 17:20

## 2018-02-22 RX ADMIN — OXYCODONE AND ACETAMINOPHEN 1 TABLET(S): 5; 325 TABLET ORAL at 05:23

## 2018-02-22 RX ADMIN — OXYCODONE AND ACETAMINOPHEN 1 TABLET(S): 5; 325 TABLET ORAL at 16:50

## 2018-02-22 RX ADMIN — HEPARIN SODIUM 5000 UNIT(S): 5000 INJECTION INTRAVENOUS; SUBCUTANEOUS at 05:21

## 2018-02-22 RX ADMIN — HEPARIN SODIUM 5000 UNIT(S): 5000 INJECTION INTRAVENOUS; SUBCUTANEOUS at 21:37

## 2018-02-22 RX ADMIN — Medication 1 DROP(S): at 05:20

## 2018-02-22 RX ADMIN — OXYCODONE AND ACETAMINOPHEN 1 TABLET(S): 5; 325 TABLET ORAL at 09:27

## 2018-02-22 RX ADMIN — OXYCODONE AND ACETAMINOPHEN 1 TABLET(S): 5; 325 TABLET ORAL at 09:59

## 2018-02-22 RX ADMIN — AMPICILLIN SODIUM AND SULBACTAM SODIUM 200 GRAM(S): 250; 125 INJECTION, POWDER, FOR SUSPENSION INTRAMUSCULAR; INTRAVENOUS at 23:22

## 2018-02-22 NOTE — PROGRESS NOTE ADULT - SUBJECTIVE AND OBJECTIVE BOX
67y Male with left digastric abscess s/p needle aspiration 2/12 2/13: CAMPBELL overnight. AFVSS. Tolerating PO. Pain well controlled. Exam stable. Denies dyspnea, CP, dysphagia, n/v, f/c/s. On unasyn for abx tx. WBC downtrending.   2/14: CAMPBELL. AFVSS. Patient with purulence expressible from prior needle sites so formal I&D performed with 3-4cc pus drained at bedside. Tolerated well. WBC downtrending. Tolerating PO, +odynophagia.   2/15: AFVSS. Patient with worsening dysphagia and coughing with drinking this AM. Repeat FFL showing worsening pharyngeal/supraglottic edema. No dyspnea or stridor/stertor. Tolerating secretions. Plan for STAT CT and transfer to SDU for continuous pulse ox. WBC continues to downtrend.   2/16: s/p urgent IR drainage of parapharyngeal space abscess, 1 neck drain placed. tolerated well. Voice improved this AM. Has not trialed PO. Tolerating secretions. Denies dyspnea, stridor/stertor. AFVSS.  2/17: CAMPBELL. AFVSS. Pain improved. Dysphagia improved. Tolerating soft diet. No dyspnea. Continues to drain thick purulent material from drain. WBC stable.   2/18: CAMPBELL.  AFVSS.  Pain improved, tolerating PO, nectar thick liquids.  persistent drainage from wound sites, NPL normalized yesterday, transferred to regular room  2/19: advanced to thin liquids which he tolerated well, pain controlled, persistent purulent drainage within drain and from I&D site, afebrile  2/20: Well overnight. pain improved. tolerating PO.   2/21: taken to OR for I&D + washout and extraction of teeth. Procedure well tolerated. Pain controlled. Tolerating soft diet. ambulating   2/22: CAMPBELL. AFVSS. Tolerating soft diet. Pain well controlled. No dyspnea, dysphagia, voice changes.     Vital Signs Last 24 Hrs  T(C): 37 (22 Feb 2018 05:28), Max: 37.5 (21 Feb 2018 20:10)  T(F): 98.6 (22 Feb 2018 05:28), Max: 99.5 (21 Feb 2018 20:10)  HR: 54 (22 Feb 2018 05:28) (51 - 76)  BP: 107/68 (22 Feb 2018 05:28) (99/67 - 144/67)  BP(mean): 87 (21 Feb 2018 11:45) (72 - 103)  RR: 16 (22 Feb 2018 05:28) (14 - 16)  SpO2: 99% (22 Feb 2018 05:28) (96% - 99%)    I&O's Summary    21 Feb 2018 07:01  -  22 Feb 2018 07:00  --------------------------------------------------------  IN: 950 mL / OUT: 1075 mL / NET: -125 mL        PHYSICAL EXAM:  NAD, alert and appropriately responsive  nonlabored respirations on RA, no stridor/stertor, tolerating secretions  voice strong and clear  OC/OPx mandibular incisor extraction sites healing well, no active bleeding  no mucosal lesions, FOM/tongue/buccal mucosa soft, no induration or swelling.   Neck erythema improved, 1cm area of residual induration and erythema in submentum  neck penrose x2 in place, neck dressing slightly saturated with seropurulent material  L lateral neck soft flat,  no ttp over angle of mandible   avila ORNELAS     Labs:                        12.7   11.3  )-----------( 210      ( 22 Feb 2018 06:27 )             38.1       Assessment/Plan:     67y Male with left soft tissue vs. intramuscular abscess s/p I&D 2/14 with worsening oropharyngeal/supraglottic edema on repeat FFL and development of large multifocal parapharyngeal abscess on repeat imaging s/p IR drainage 2/15. S/P formal OR I&D and washout and teeth extraion. Continues to improve, airway edema resolved.    -Continue unasyn for abx tx as culture x2 strep viridans pansensitive   -f/u OR culture 2/21  -Mech soft diet, clear liquids  -daily dressing change  -nicotine patch   -home meds  -oob ad mary  -IS  -drain care  -prn pain/nausea   -DVT ppx: SCDs, SQH  -[facility] at [phone] with any questions/concerns.    Dispo: regional room    seen with chief and d/w attending 67y Male with deep neck abscesses     2/12:  admitted via ED with submental abscess s/p needle aspiration   2/13: CAMPBELL overnight. AFVSS. Tolerating PO. Pain well controlled. Exam stable. Denies dyspnea, CP, dysphagia, n/v, f/c/s. On unasyn for abx tx. WBC downtrending.   2/14: CAMPBELL. AFVSS. Patient with purulence expressible from prior needle sites so formal I&D performed with 3-4cc pus drained at bedside. Tolerated well. WBC downtrending. Tolerating PO, +odynophagia.   2/15: AFVSS. Patient with worsening dysphagia and coughing with drinking this AM. Repeat FFL showing worsening pharyngeal/supraglottic edema. No dyspnea or stridor/stertor. Tolerating secretions. Neck CT done and transfer to SDU for continuous pulse ox. WBC continues to downtrend.   Had urgent IR drainage of left parapharyngeal space abscess (new on CT scan)  2/16: s/p urgent IR drainage of parapharyngeal space abscess, 1 neck drain placed. tolerated well. Voice improved this AM. Has not trialed PO. Tolerating secretions. Denies dyspnea, stridor/stertor. AFVSS.  2/17: CAMPBELL. AFVSS. Pain improved. Dysphagia improved. Tolerating soft diet. No dyspnea. Continues to drain thick purulent material from drain. WBC stable.   2/18: CAMPBELL.  AFVSS.  Pain improved, tolerating PO, nectar thick liquids.  persistent drainage from wound sites, NPL normalized yesterday, transferred to regular room  2/19: advanced to thin liquids which he tolerated well, pain controlled, persistent purulent drainage within drain and from I&D site, afebrile  2/20: Well overnight. pain improved. tolerating PO. I&D planned in OR but could not do because of scheduling by evening; rescheduled for AM  2/21: taken to OR for I&D + washout neck abscesses and extraction of teeth. Procedure well tolerated. Pain controlled. Tolerating soft diet. ambulating   2/22: CAMPBELL. AFVSS. Tolerating soft diet. Pain well controlled. No dyspnea, dysphagia, voice changes. penrose drains with saturation of Kerlix dressing.    Vital Signs Last 24 Hrs  T(C): 37 (22 Feb 2018 05:28), Max: 37.5 (21 Feb 2018 20:10)  T(F): 98.6 (22 Feb 2018 05:28), Max: 99.5 (21 Feb 2018 20:10)  HR: 54 (22 Feb 2018 05:28) (51 - 76)  BP: 107/68 (22 Feb 2018 05:28) (99/67 - 144/67)  BP(mean): 87 (21 Feb 2018 11:45) (72 - 103)  RR: 16 (22 Feb 2018 05:28) (14 - 16)  SpO2: 99% (22 Feb 2018 05:28) (96% - 99%)    I&O's Summary    21 Feb 2018 07:01  -  22 Feb 2018 07:00  --------------------------------------------------------  IN: 950 mL / OUT: 1075 mL / NET: -125 mL        PHYSICAL EXAM:  NAD, alert and appropriately responsive  nonlabored respirations on RA, no stridor/stertor, tolerating secretions  voice strong and clear  OC/OPx mandibular incisor extraction sites healing well, no active bleeding  no mucosal lesions, FOM/tongue/buccal mucosa soft, no induration or swelling.   Neck erythema improved, 1cm area of residual induration and erythema in submentum  neck penrose x2 in place, neck dressing saturated with seropurulent material  Left lateral neck soft flat,  no ttp over angle of mandible   avila ORNELAS     Labs:                        12.7   11.3  )-----------( 210      ( 22 Feb 2018 06:27 )             38.1       Assessment/Plan:     67y Male with left parapharyngeal space and submental abscesses S/P formal OR I&D and washout and teeth extractions on 2/21.   Continues to improve.  WBC likely response to washout yesterday.    -Continue unasyn for abx tx as culture x 2 strep viridans pansensitive   -f/u OR culture 2/21  -Mech soft diet, clear liquids  -daily dressing change  -nicotine patch   -home meds  -oob ad mary  -IS  -drain care  -prn pain/nausea   -DVT ppx: SCDs, SQH  -[facility] at [phone] with any questions/concerns.    Dispo: regional room    seen with chief and d/w attending

## 2018-02-23 LAB
HCT VFR BLD CALC: 37.7 % — LOW (ref 39–50)
HGB BLD-MCNC: 12.4 G/DL — LOW (ref 13–17)
MCHC RBC-ENTMCNC: 31.7 PG — SIGNIFICANT CHANGE UP (ref 27–34)
MCHC RBC-ENTMCNC: 32.9 G/DL — SIGNIFICANT CHANGE UP (ref 32–36)
MCV RBC AUTO: 96.4 FL — SIGNIFICANT CHANGE UP (ref 80–100)
PLATELET # BLD AUTO: 195 K/UL — SIGNIFICANT CHANGE UP (ref 150–400)
RBC # BLD: 3.91 M/UL — LOW (ref 4.2–5.8)
RBC # FLD: 12.3 % — SIGNIFICANT CHANGE UP (ref 10.3–16.9)
WBC # BLD: 8.4 K/UL — SIGNIFICANT CHANGE UP (ref 3.8–10.5)
WBC # FLD AUTO: 8.4 K/UL — SIGNIFICANT CHANGE UP (ref 3.8–10.5)

## 2018-02-23 RX ADMIN — Medication 1 DROP(S): at 05:53

## 2018-02-23 RX ADMIN — OXYCODONE AND ACETAMINOPHEN 1 TABLET(S): 5; 325 TABLET ORAL at 22:06

## 2018-02-23 RX ADMIN — OXYCODONE AND ACETAMINOPHEN 1 TABLET(S): 5; 325 TABLET ORAL at 11:49

## 2018-02-23 RX ADMIN — HEPARIN SODIUM 5000 UNIT(S): 5000 INJECTION INTRAVENOUS; SUBCUTANEOUS at 05:53

## 2018-02-23 RX ADMIN — CHLORHEXIDINE GLUCONATE 15 MILLILITER(S): 213 SOLUTION TOPICAL at 21:36

## 2018-02-23 RX ADMIN — OXYCODONE AND ACETAMINOPHEN 1 TABLET(S): 5; 325 TABLET ORAL at 17:49

## 2018-02-23 RX ADMIN — OXYCODONE AND ACETAMINOPHEN 1 TABLET(S): 5; 325 TABLET ORAL at 21:36

## 2018-02-23 RX ADMIN — AMPICILLIN SODIUM AND SULBACTAM SODIUM 200 GRAM(S): 250; 125 INJECTION, POWDER, FOR SUSPENSION INTRAMUSCULAR; INTRAVENOUS at 05:53

## 2018-02-23 RX ADMIN — HEPARIN SODIUM 5000 UNIT(S): 5000 INJECTION INTRAVENOUS; SUBCUTANEOUS at 13:34

## 2018-02-23 RX ADMIN — LISINOPRIL 10 MILLIGRAM(S): 2.5 TABLET ORAL at 05:53

## 2018-02-23 RX ADMIN — OXYCODONE AND ACETAMINOPHEN 1 TABLET(S): 5; 325 TABLET ORAL at 05:55

## 2018-02-23 RX ADMIN — AMPICILLIN SODIUM AND SULBACTAM SODIUM 200 GRAM(S): 250; 125 INJECTION, POWDER, FOR SUSPENSION INTRAMUSCULAR; INTRAVENOUS at 11:46

## 2018-02-23 RX ADMIN — HEPARIN SODIUM 5000 UNIT(S): 5000 INJECTION INTRAVENOUS; SUBCUTANEOUS at 21:36

## 2018-02-23 RX ADMIN — CHLORHEXIDINE GLUCONATE 15 MILLILITER(S): 213 SOLUTION TOPICAL at 05:53

## 2018-02-23 RX ADMIN — AMPICILLIN SODIUM AND SULBACTAM SODIUM 200 GRAM(S): 250; 125 INJECTION, POWDER, FOR SUSPENSION INTRAMUSCULAR; INTRAVENOUS at 23:41

## 2018-02-23 RX ADMIN — OXYCODONE AND ACETAMINOPHEN 1 TABLET(S): 5; 325 TABLET ORAL at 06:55

## 2018-02-23 RX ADMIN — OXYCODONE AND ACETAMINOPHEN 1 TABLET(S): 5; 325 TABLET ORAL at 12:30

## 2018-02-23 RX ADMIN — Medication 1 DROP(S): at 17:12

## 2018-02-23 RX ADMIN — AMPICILLIN SODIUM AND SULBACTAM SODIUM 200 GRAM(S): 250; 125 INJECTION, POWDER, FOR SUSPENSION INTRAMUSCULAR; INTRAVENOUS at 17:12

## 2018-02-23 RX ADMIN — CHLORHEXIDINE GLUCONATE 15 MILLILITER(S): 213 SOLUTION TOPICAL at 13:34

## 2018-02-23 RX ADMIN — OXYCODONE AND ACETAMINOPHEN 1 TABLET(S): 5; 325 TABLET ORAL at 17:19

## 2018-02-23 NOTE — PROGRESS NOTE ADULT - ATTENDING COMMENTS
He complains of intermittent voice change since OR; better after clears throat.  Voice sounds a little mucously.  Neck with scant serosanguinous d/c by later afternoon.  Nontender neck.  mild edema left SCM.  Penrose drains devanced.  Continue antibiotics.
He feels ok.  Neck swelling decreased.  Having thick purulent d/c from left neck drain and continued pus from submental abscess site.  Swelling laryngopharynx much improved     continue antibx  transfer to floor possible today  Continue packing changes
WBC normal but continued drainage from submental wound and from left PPS drain  Will repeat CT neck  If collection, will I&D in OR
greatly improved.  Still with drainage from both sites neck  contnue antbs  to regular room
Pt reports no worsening of neck discomfort and swelling.  Still swelling and tenderness submental, left submandibular areas but slightly less than last night.  WBC still high but decreased.  Continue current rx and check cultures
feeling better and white count decreasing steadily  with purulent d/c from submental I&D site.  Still with moderate edema and mild erythema left neck  Cx with GP cocci, f/up identification  Continue antibiotics

## 2018-02-23 NOTE — CHART NOTE - NSCHARTNOTEFT_GEN_A_CORE
Admitting Diagnosis:   Patient is a 67y old  Male who presents with a chief complaint of soft tissue neck abscess (12 Feb 2018 20:05)      PAST MEDICAL & SURGICAL HISTORY:  Glaucoma  HTN (hypertension)  No significant past surgical history      Current Nutrition Order: Dysphagia 2 mechanical soft, thin liquids    PO Intake: Good (%) [ x ]  Fair (50-75%) [   ] Poor (<25%) [   ]    GI Issues: No n/v/d/c reported    Pain: None reported     Skin Integrity: Surgical incision      Medications:  MEDICATIONS  (STANDING):  ampicillin/sulbactam  IVPB 3 Gram(s) IV Intermittent every 6 hours  chlorhexidine 0.12% Liquid 15 milliLiter(s) Swish and Spit three times a day  heparin  Injectable 5000 Unit(s) SubCutaneous every 8 hours  hydrochlorothiazide 25 milliGRAM(s) Oral daily  lisinopril 10 milliGRAM(s) Oral daily  nicotine -   7 mG/24Hr(s) Patch 1 patch Transdermal daily  sodium chloride 0.9%. 1000 milliLiter(s) (100 mL/Hr) IV Continuous <Continuous>  timolol 0.5% Solution 1 Drop(s) Both EYES two times a day    MEDICATIONS  (PRN):  acetaminophen   Tablet. 650 milliGRAM(s) Oral every 6 hours PRN Mild Pain (1 - 3)  ibuprofen  Tablet 400 milliGRAM(s) Oral every 6 hours PRN moderate pain  morphine  - Injectable 2 milliGRAM(s) IV Push every 4 hours PRN Severe Pain (7 - 10)  ondansetron Injectable 4 milliGRAM(s) IV Push every 6 hours PRN Nausea and/or Vomiting  oxyCODONE    5 mG/acetaminophen 325 mG 1 Tablet(s) Oral every 4 hours PRN Moderate Pain (4 - 6)    Weight:  No new weights to assess    Estimated energy needs using 64 kg IBW:  IBW used due to pt > 120% of IBW.  Needs 2* procedure 2/20  25-30 kcal/kg (9889-3704 kcal).   1-1.2 g/kg (64-77 g protein).   30-35 mL/kg (8821-1745 mL fluid).      Subjective: 2/21 pt was taken to OR for I&D + washout and extraction of teeth. Currently on a mechanical soft diet, thin liquids.  Tolerating soft diet well, denies any issues chewing or swallowing. Endorses good appetite, consuming % meals. Denies N/V/D/C.     Previous Nutrition Diagnosis:  Inadequate oral intake RT Inability to take PO AEB NPO for procedure    Active [   ]  Resolved [ x ]    If resolved, new PES: h/o inconsistent PO intake RT previous difficulty chewing/swallowing AEB development of large multifocal parapharyngeal abscess and need for soft diet    Goal: pt to continue meeting >75% estimated needs PO    Recommendations:  1. Current diet order appropriate.   2. obtain recent wt for trending.     Education: Adequate oral intake for healing    Risk Level: High [   ] Moderate [ x  ] Low [   ].

## 2018-02-23 NOTE — PROGRESS NOTE ADULT - SUBJECTIVE AND OBJECTIVE BOX
67y Male with left digastric abscess s/p needle aspiration 2/12 2/13: CAMPBELL overnight. AFVSS. Tolerating PO. Pain well controlled. Exam stable. Denies dyspnea, CP, dysphagia, n/v, f/c/s. On unasyn for abx tx. WBC downtrending.   2/14: CAMPBELL. AFVSS. Patient with purulence expressible from prior needle sites so formal I&D performed with 3-4cc pus drained at bedside. Tolerated well. WBC downtrending. Tolerating PO, +odynophagia.   2/15: AFVSS. Patient with worsening dysphagia and coughing with drinking this AM. Repeat FFL showing worsening pharyngeal/supraglottic edema. No dyspnea or stridor/stertor. Tolerating secretions. Plan for STAT CT and transfer to SDU for continuous pulse ox. WBC continues to downtrend.   2/16: s/p urgent IR drainage of parapharyngeal space abscess, 1 neck drain placed. tolerated well. Voice improved this AM. Has not trialed PO. Tolerating secretions. Denies dyspnea, stridor/stertor. AFVSS.  2/17: CAMPBELL. AFVSS. Pain improved. Dysphagia improved. Tolerating soft diet. No dyspnea. Continues to drain thick purulent material from drain. WBC stable.   2/18: CAMPBELL.  AFVSS.  Pain improved, tolerating PO, nectar thick liquids.  persistent drainage from wound sites, NPL normalized yesterday, transferred to regular room  2/19: advanced to thin liquids which he tolerated well, pain controlled, persistent purulent drainage within drain and from I&D site, afebrile  2/20: Well overnight. pain improved. tolerating PO.   2/21: taken to OR for I&D + washout and extraction of teeth. Procedure well tolerated. Pain controlled. Tolerating soft diet. ambulating   2/22: CAMPBELL. AFVSS. Tolerating soft diet. Pain well controlled. No dyspnea, dysphagia, voice changes.   2/23: CAMPBELL. AFVSS. WBC normalized. No new complaints.     Vital Signs Last 24 Hrs  T(C): 36.8 (23 Feb 2018 13:55), Max: 36.8 (22 Feb 2018 17:42)  T(F): 98.3 (23 Feb 2018 13:55), Max: 98.3 (22 Feb 2018 17:42)  HR: 48 (23 Feb 2018 13:55) (48 - 53)  BP: 138/79 (23 Feb 2018 13:55) (111/61 - 143/81)  RR: 16 (23 Feb 2018 13:55) (16 - 16)  SpO2: 97% (23 Feb 2018 13:55) (96% - 99%)    I&O's Summary    22 Feb 2018 07:01  -  23 Feb 2018 07:00  --------------------------------------------------------  IN: 2920 mL / OUT: 0 mL / NET: 2920 mL            PHYSICAL EXAM:  NAD, alert and appropriately responsive  nonlabored respirations on RA, no stridor/stertor, tolerating secretions  voice strong and clear  OC/OPx mandibular incisor extraction sites healing well, no active bleeding  FOM/tongue/buccal mucosa soft, no induration or swelling.   Neck erythema improved, mild induration along inferior border of mandible  mild ttp, no fluctuance   neck penrose x2 in place, neck dressing slightly saturated with seropurulent material  avila ORNELAS     Labs:                                 12.4   8.4   )-----------( 195      ( 23 Feb 2018 07:00 )             37.7         Assessment/Plan:     67y Male with left soft tissue vs. intramuscular abscess s/p I&D 2/14 with worsening oropharyngeal/supraglottic edema on repeat FFL and development of large multifocal parapharyngeal abscess on repeat imaging s/p IR drainage 2/15. S/P formal OR I&D and washout and teeth extraion. Continues to improve, airway edema resolved.    -Continue unasyn for abx tx as culture x2 strep viridans pansensitive   -f/u OR culture 2/21  -Soft diet   -daily dressing change  -nicotine patch   -home meds  -oob ad mary  -IS  -drain care  -prn pain/nausea   -DVT ppx: SCDs, SQH  -[facility] at [phone] with any questions/concerns.    Dispo: regional room    seen with chief and d/w attending 67y Male with deep neck abscesses  2/12: admitted via ED with submental abscess s/p needle aspiration   2/13: ACMPBELL overnight. AFVSS. Tolerating PO. Pain well controlled. Exam stable. Denies dyspnea, CP, dysphagia, n/v, f/c/s. On unasyn for abx tx. WBC downtrending.   2/14: CAMPBELL. AFVSS. Patient with purulence expressible from prior needle sites so formal submental I&D performed with 3-4cc pus drained at bedside. Tolerated well. WBC downtrending. Tolerating PO, +odynophagia.   2/15: AFVSS. Patient with worsening dysphagia and coughing with drinking this AM. Repeat FFL showing worsening pharyngeal/supraglottic edema. No dyspnea or stridor/stertor. Tolerating secretions. Neck CT done, and transfer to SDU for continuous pulse ox. WBC continues to downtrend. Had urgent IR drainage of parapharyngeal space abscess, new on CT,  2/16: s/p urgent IR drainage of parapharyngeal space abscess, 1 neck drain placed. tolerated well. Voice improved this AM. Has not trialed PO. Tolerating secretions. Denies dyspnea, stridor/stertor. AFVSS.  2/17: CAMPBELL. AFVSS. Pain improved. Dysphagia improved. Tolerating soft diet. No dyspnea. Continues to drain thick purulent material from drain. WBC stable.   2/18: CAMPBELL.  AFVSS.  Pain improved, tolerating PO, nectar thick liquids.  persistent drainage from wound sites, FOL exam normalized yesterday, transferred to regular room  2/19: advanced to thin liquids which he tolerated well, pain controlled, persistent purulent drainage within drain and from I&D site, afebrile.  Repeat CT Neck showed anterior deep neck collection and in parapharyngeal space.  2/20: Well overnight. pain improved. tolerating PO.   I&D planned but not able to get in OR.  2/21: taken to OR for I&D + washout and extraction of teeth. Procedure well tolerated. Pain controlled. Tolerating soft diet. ambulating   2/22: CAMPBELL. AFVSS. Tolerating soft diet. Pain well controlled. No dyspnea, dysphagia, voice changes. Lot of seropurulent drainage on dressing.  2/23: CAMPBELL. AFVSS. WBC normalized. No new complaints.     Vital Signs Last 24 Hrs  T(C): 36.8 (23 Feb 2018 13:55), Max: 36.8 (22 Feb 2018 17:42)  T(F): 98.3 (23 Feb 2018 13:55), Max: 98.3 (22 Feb 2018 17:42)  HR: 48 (23 Feb 2018 13:55) (48 - 53)  BP: 138/79 (23 Feb 2018 13:55) (111/61 - 143/81)  RR: 16 (23 Feb 2018 13:55) (16 - 16)  SpO2: 97% (23 Feb 2018 13:55) (96% - 99%)    I&O's Summary    22 Feb 2018 07:01  -  23 Feb 2018 07:00  --------------------------------------------------------  IN: 2920 mL / OUT: 0 mL / NET: 2920 mL        PHYSICAL EXAM:  NAD, alert and appropriately responsive  nonlabored respirations on RA, no stridor/stertor, tolerating secretions  voice strong and clear  OC/OPx mandibular incisor extraction sites healing well, no active bleeding  FOM/tongue/buccal mucosa soft, no induration or swelling.   Neck erythema improved, mild induration along inferior border of mandible  mild ttp, no fluctuance   neck penrose x2 in place, neck dressing slightly saturated with seropurulent material  avila ORNELAS     Labs:                                 12.4   8.4   )-----------( 195      ( 23 Feb 2018 07:00 )             37.7         Assessment/Plan:     67y Male with deep neck abscesses S/P formal OR I&D and washout and teeth extraction on 2/21.   Much less drainage today.  -Continue unasyn for abx tx as culture x2 strep viridans pansensitive   -f/u OR culture 2/21  -Soft diet   -daily dressing change  -nicotine patch   -home meds  -oob ad mary  -IS  -drain care  -prn pain/nausea   -DVT ppx: SCDs, SQH  -[facility] at [phone] with any questions/concerns.    Dispo: regional room    seen with chief and d/w attending

## 2018-02-24 ENCOUNTER — TRANSCRIPTION ENCOUNTER (OUTPATIENT)
Age: 68
End: 2018-02-24

## 2018-02-24 VITALS
RESPIRATION RATE: 16 BRPM | DIASTOLIC BLOOD PRESSURE: 73 MMHG | OXYGEN SATURATION: 98 % | SYSTOLIC BLOOD PRESSURE: 121 MMHG | TEMPERATURE: 98 F | HEART RATE: 59 BPM

## 2018-02-24 LAB
CULTURE RESULTS: NO GROWTH — SIGNIFICANT CHANGE UP
SPECIMEN SOURCE: SIGNIFICANT CHANGE UP

## 2018-02-24 PROCEDURE — 10030 IMG GID FLU COLL DRG SFT TIS: CPT

## 2018-02-24 PROCEDURE — 99285 EMERGENCY DEPT VISIT HI MDM: CPT | Mod: 25

## 2018-02-24 PROCEDURE — 88300 SURGICAL PATH GROSS: CPT

## 2018-02-24 PROCEDURE — 87184 SC STD DISK METHOD PER PLATE: CPT

## 2018-02-24 PROCEDURE — 86850 RBC ANTIBODY SCREEN: CPT

## 2018-02-24 PROCEDURE — 85025 COMPLETE CBC W/AUTO DIFF WBC: CPT

## 2018-02-24 PROCEDURE — 82962 GLUCOSE BLOOD TEST: CPT

## 2018-02-24 PROCEDURE — 99153 MOD SED SAME PHYS/QHP EA: CPT

## 2018-02-24 PROCEDURE — 80048 BASIC METABOLIC PNL TOTAL CA: CPT

## 2018-02-24 PROCEDURE — C1729: CPT

## 2018-02-24 PROCEDURE — 84100 ASSAY OF PHOSPHORUS: CPT

## 2018-02-24 PROCEDURE — 93005 ELECTROCARDIOGRAM TRACING: CPT

## 2018-02-24 PROCEDURE — 70491 CT SOFT TISSUE NECK W/DYE: CPT

## 2018-02-24 PROCEDURE — 86900 BLOOD TYPING SEROLOGIC ABO: CPT

## 2018-02-24 PROCEDURE — 85027 COMPLETE CBC AUTOMATED: CPT

## 2018-02-24 PROCEDURE — 88305 TISSUE EXAM BY PATHOLOGIST: CPT

## 2018-02-24 PROCEDURE — 87205 SMEAR GRAM STAIN: CPT

## 2018-02-24 PROCEDURE — 87206 SMEAR FLUORESCENT/ACID STAI: CPT

## 2018-02-24 PROCEDURE — 87070 CULTURE OTHR SPECIMN AEROBIC: CPT

## 2018-02-24 PROCEDURE — 87116 MYCOBACTERIA CULTURE: CPT

## 2018-02-24 PROCEDURE — 87075 CULTR BACTERIA EXCEPT BLOOD: CPT

## 2018-02-24 PROCEDURE — 85730 THROMBOPLASTIN TIME PARTIAL: CPT

## 2018-02-24 PROCEDURE — 99152 MOD SED SAME PHYS/QHP 5/>YRS: CPT

## 2018-02-24 PROCEDURE — C1769: CPT

## 2018-02-24 PROCEDURE — 85610 PROTHROMBIN TIME: CPT

## 2018-02-24 PROCEDURE — 86901 BLOOD TYPING SEROLOGIC RH(D): CPT

## 2018-02-24 PROCEDURE — 83735 ASSAY OF MAGNESIUM: CPT

## 2018-02-24 PROCEDURE — 36415 COLL VENOUS BLD VENIPUNCTURE: CPT

## 2018-02-24 RX ORDER — HYDROCHLOROTHIAZIDE 25 MG
0 TABLET ORAL
Qty: 0 | Refills: 0 | COMMUNITY

## 2018-02-24 RX ORDER — LISINOPRIL 2.5 MG/1
0 TABLET ORAL
Qty: 0 | Refills: 0 | COMMUNITY

## 2018-02-24 RX ORDER — TIMOLOL 0.5 %
0 DROPS OPHTHALMIC (EYE)
Qty: 0 | Refills: 0 | COMMUNITY

## 2018-02-24 RX ORDER — CHLORHEXIDINE GLUCONATE 213 G/1000ML
15 SOLUTION TOPICAL
Qty: 1000 | Refills: 0
Start: 2018-02-24 | End: 2018-03-05

## 2018-02-24 RX ORDER — LACTOBACILLUS ACIDOPHILUS 100MM CELL
1 CAPSULE ORAL
Qty: 20 | Refills: 0
Start: 2018-02-24 | End: 2018-03-05

## 2018-02-24 RX ADMIN — CHLORHEXIDINE GLUCONATE 15 MILLILITER(S): 213 SOLUTION TOPICAL at 05:38

## 2018-02-24 RX ADMIN — AMPICILLIN SODIUM AND SULBACTAM SODIUM 200 GRAM(S): 250; 125 INJECTION, POWDER, FOR SUSPENSION INTRAMUSCULAR; INTRAVENOUS at 05:38

## 2018-02-24 RX ADMIN — Medication 1 DROP(S): at 05:38

## 2018-02-24 RX ADMIN — LISINOPRIL 10 MILLIGRAM(S): 2.5 TABLET ORAL at 05:38

## 2018-02-24 RX ADMIN — HEPARIN SODIUM 5000 UNIT(S): 5000 INJECTION INTRAVENOUS; SUBCUTANEOUS at 05:38

## 2018-02-24 RX ADMIN — OXYCODONE AND ACETAMINOPHEN 1 TABLET(S): 5; 325 TABLET ORAL at 11:10

## 2018-02-24 RX ADMIN — OXYCODONE AND ACETAMINOPHEN 1 TABLET(S): 5; 325 TABLET ORAL at 10:07

## 2018-02-24 RX ADMIN — Medication 25 MILLIGRAM(S): at 05:38

## 2018-02-24 NOTE — DISCHARGE NOTE ADULT - HOSPITAL COURSE
67y Male with left digastric abscess s/p needle aspiration 2/12 s/p formal I&D in the operating room on 2/21 2/13: CAMPBELL overnight. AFVSS. Tolerating PO. Pain well controlled. Exam stable. Denies dyspnea, CP, dysphagia, n/v, f/c/s. On unasyn for abx tx. WBC downtrending.   2/14: CAMPBELL. AFVSS. Patient with purulence expressible from prior needle sites so formal I&D performed with 3-4cc pus drained at bedside. Tolerated well. WBC downtrending. Tolerating PO, +odynophagia.   2/15: AFVSS. Patient with worsening dysphagia and coughing with drinking this AM. Repeat FFL showing worsening pharyngeal/supraglottic edema. No dyspnea or stridor/stertor. Tolerating secretions. Plan for STAT CT and transfer to SDU for continuous pulse ox. WBC continues to downtrend.   2/16: s/p urgent IR drainage of parapharyngeal space abscess, 1 neck drain placed. tolerated well. Voice improved this AM. Has not trialed PO. Tolerating secretions. Denies dyspnea, stridor/stertor. AFVSS.  2/17: CAMPBELL. AFVSS. Pain improved. Dysphagia improved. Tolerating soft diet. No dyspnea. Continues to drain thick purulent material from drain. WBC stable.   2/18: CAMPBELL.  AFVSS.  Pain improved, tolerating PO, nectar thick liquids.  persistent drainage from wound sites, NPL normalized yesterday, transferred to regular room  2/19: advanced to thin liquids which he tolerated well, pain controlled, persistent purulent drainage within drain and from I&D site, afebrile  2/20: Well overnight. pain improved. tolerating PO.   2/21: taken to OR for I&D + washout and extraction of teeth. Procedure well tolerated. Pain controlled. Tolerating soft diet. ambulating   2/22: CAMPBELL. AFVSS. Tolerating soft diet. Pain well controlled. No dyspnea, dysphagia, voice changes.   2/23: CAMPBELL. AFVSS. WBC normalized. No new complaints.   2/24: No acute events overnight. penrose drains removed from neck. minimal serosanguinous fluid from incision sites. afebrile. pain controlled. tolerating PO. feeling well      PHYSICAL EXAM:  NAD, alert and appropriately responsive  nonlabored respirations on RA, no stridor/stertor, tolerating secretions  voice strong and clear  OC/OPx mandibular incisor extraction sites healing well, no active bleeding  FOM/tongue/buccal mucosa soft, no induration or swelling.   Neck erythema improved, mild induration along inferior border of mandible  mild ttp, no fluctuance   Incision sites healing  avila ORNELAS     Labs:                                 12.4   8.4   )-----------( 195      ( 23 Feb 2018 07:00 )             37.7

## 2018-02-24 NOTE — DISCHARGE NOTE ADULT - CARE PLAN
Principal Discharge DX:	Submandibular abscess  Goal:	Recover from abscess  Assessment and plan of treatment:	1.	Report any fever, chills, difficulty breathing, difficulty swallowing, and change in your voice, bleeding or purulent drainage from your incision sites, or any significant swelling to your neck to the doctor immediately.  2.	Activity: no heavy lifting, do not lift anything heavier than a gallon of milk until after you follow up appointment with your doctor, no strenuous activity such as running or biking.  3.	Shower/Bathing: you shower starting 48 hrs, after 48 hrs you may wash your hair and shower but do not scrub at your neck incision  4.	Wound care: keep your incision site clean and dry   5.	Take all medications as prescribed.   6.	Continue all of your normal home medications unless told otherwise.  7.	Avoid any NSAIDS or ibuprofen/asa containing products you may take Tylenol for mild pain.

## 2018-02-24 NOTE — PROGRESS NOTE ADULT - SUBJECTIVE AND OBJECTIVE BOX
He feels well.  Little pain in neck and mouth.  Voice worse when first gets up in AM, better after sitting up for a few minutes.    Lower gums healing well, no bleeding.  Penrose drains were out when dressing removed this AM.  no fluid able to be expressed from neck.  Neck is soft, no erythema.  Left SCM swelling going down.    WBC normal    A/ healing after treatment of deep neck abscesses  P/ d/c home today on Augmentin and Peridex  Will steri strip small neck openings   f/up with me in office next week.  Instructed him to look out for neck swelling, fever

## 2018-02-24 NOTE — DISCHARGE NOTE ADULT - NS AS DC FOLLOWUP STROKE INST
[email or web address] Online Patient Education: Surgical Wound Discharge Instructions/Influenza vaccination (VIS Pub Date: August 19, 2014)

## 2018-02-24 NOTE — DISCHARGE NOTE ADULT - CARE PROVIDER_API CALL
Lynn Morton), Otolaryngology  [address]  [address]  [place], [place] [address]  Phone: [phone]  Fax: [phone]

## 2018-02-24 NOTE — DISCHARGE NOTE ADULT - PATIENT PORTAL LINK FT
You can access the [facility] Patient Portal, offered by [facility], by registering with the following website: [email or web address]

## 2018-02-24 NOTE — DISCHARGE NOTE ADULT - PLAN OF CARE
Recover from abscess 1.	Report any fever, chills, difficulty breathing, difficulty swallowing, and change in your voice, bleeding or purulent drainage from your incision sites, or any significant swelling to your neck to the doctor immediately.  2.	Activity: no heavy lifting, do not lift anything heavier than a gallon of milk until after you follow up appointment with your doctor, no strenuous activity such as running or biking.  3.	Shower/Bathing: you shower starting 48 hrs, after 48 hrs you may wash your hair and shower but do not scrub at your neck incision  4.	Wound care: keep your incision site clean and dry   5.	Take all medications as prescribed.   6.	Continue all of your normal home medications unless told otherwise.  7.	Avoid any NSAIDS or ibuprofen/asa containing products you may take Tylenol for mild pain.

## 2018-02-24 NOTE — DISCHARGE NOTE ADULT - MEDICATION SUMMARY - MEDICATIONS TO TAKE
I will START or STAY ON the medications listed below when I get home from the hospital:    amoxicillin-clavulanate 875 mg-125 mg oral tablet  -- 1 tab(s) by mouth every 12 hours   -- Finish all this medication unless otherwise directed by prescriber.  Take with food or milk.    -- Indication: For neck infection    lactobacillus acidophilus oral capsule  -- 1 cap(s) by mouth every 12 hours   -- Indication: For bowel prophylaxis while on antibiotics

## 2018-02-26 ENCOUNTER — TRANSCRIPTION ENCOUNTER (OUTPATIENT)
Age: 68
End: 2018-02-26

## 2018-02-26 PROBLEM — Z00.00 ENCOUNTER FOR PREVENTIVE HEALTH EXAMINATION: Status: ACTIVE | Noted: 2018-02-26

## 2018-02-26 PROBLEM — H40.9 UNSPECIFIED GLAUCOMA: Chronic | Status: ACTIVE | Noted: 2018-02-12

## 2018-03-01 LAB — SURGICAL PATHOLOGY STUDY: SIGNIFICANT CHANGE UP

## 2018-03-02 DIAGNOSIS — K12.2 CELLULITIS AND ABSCESS OF MOUTH: ICD-10-CM

## 2018-03-02 DIAGNOSIS — J39.0 RETROPHARYNGEAL AND PARAPHARYNGEAL ABSCESS: ICD-10-CM

## 2018-03-02 DIAGNOSIS — R59.0 LOCALIZED ENLARGED LYMPH NODES: ICD-10-CM

## 2018-03-02 DIAGNOSIS — K04.1 NECROSIS OF PULP: ICD-10-CM

## 2018-03-02 DIAGNOSIS — K13.79 OTHER LESIONS OF ORAL MUCOSA: ICD-10-CM

## 2018-03-02 DIAGNOSIS — H40.9 UNSPECIFIED GLAUCOMA: ICD-10-CM

## 2018-03-02 DIAGNOSIS — L02.01 CUTANEOUS ABSCESS OF FACE: ICD-10-CM

## 2018-03-02 DIAGNOSIS — I10 ESSENTIAL (PRIMARY) HYPERTENSION: ICD-10-CM

## 2018-03-02 DIAGNOSIS — F17.200 NICOTINE DEPENDENCE, UNSPECIFIED, UNCOMPLICATED: ICD-10-CM

## 2018-03-06 ENCOUNTER — APPOINTMENT (OUTPATIENT)
Dept: OTOLARYNGOLOGY | Facility: CLINIC | Age: 68
End: 2018-03-06
Payer: MEDICARE

## 2018-03-06 VITALS
HEART RATE: 53 BPM | HEIGHT: 66 IN | BODY MASS INDEX: 29.89 KG/M2 | WEIGHT: 186 LBS | DIASTOLIC BLOOD PRESSURE: 75 MMHG | SYSTOLIC BLOOD PRESSURE: 116 MMHG

## 2018-03-06 DIAGNOSIS — R49.8 OTHER VOICE AND RESONANCE DISORDERS: ICD-10-CM

## 2018-03-06 DIAGNOSIS — L03.221 CELLULITIS OF NECK: ICD-10-CM

## 2018-03-06 DIAGNOSIS — L02.11 CELLULITIS OF NECK: ICD-10-CM

## 2018-03-06 DIAGNOSIS — J04.0 ACUTE LARYNGITIS: ICD-10-CM

## 2018-03-06 DIAGNOSIS — I10 ESSENTIAL (PRIMARY) HYPERTENSION: ICD-10-CM

## 2018-03-06 DIAGNOSIS — H44.513 ABSOLUTE GLAUCOMA, BILATERAL: ICD-10-CM

## 2018-03-06 PROCEDURE — 99024 POSTOP FOLLOW-UP VISIT: CPT

## 2018-03-06 PROCEDURE — 31575 DIAGNOSTIC LARYNGOSCOPY: CPT | Mod: 58

## 2018-03-06 RX ORDER — CHLORHEXIDINE GLUCONATE 1.2 MG/ML
0.12 RINSE ORAL
Refills: 0 | Status: ACTIVE | COMMUNITY

## 2018-03-06 RX ORDER — LISINOPRIL 10 MG/1
10 TABLET ORAL
Refills: 0 | Status: ACTIVE | COMMUNITY

## 2018-03-07 ENCOUNTER — APPOINTMENT (OUTPATIENT)
Dept: OTOLARYNGOLOGY | Facility: CLINIC | Age: 68
End: 2018-03-07
Payer: MEDICARE

## 2018-04-07 LAB
CULTURE RESULTS: SIGNIFICANT CHANGE UP
SPECIMEN SOURCE: SIGNIFICANT CHANGE UP

## 2019-03-20 ENCOUNTER — TRANSCRIPTION ENCOUNTER (OUTPATIENT)
Age: 69
End: 2019-03-20

## 2021-12-07 NOTE — ED PROVIDER NOTE - ALCOHOL USE HISTORY SINGLE SELECT
Called pt and advised he needs an ER follow up and can discuss with Dr Park at that time, is related to work comp, appt scheduled for 12-8-21   yes...

## 2022-06-23 NOTE — ED ADULT TRIAGE NOTE - RESPIRATORY RATE (BREATHS/MIN)
Received bedside report from LYNDON Krause, pt care assumed. VS WDL, pt assessment complete. Pt A&Ox4, no c/o pain at this time. POC discussed with pt and verbalizes no questions. Pt denies any additional needs at this time. Bed locked and in lowest position, bed alarm on. Pt educated on fall risk and verbalized understanding, call light within reach, hourly rounding initiated.    17

## 2023-05-01 NOTE — PATIENT PROFILE ADULT. - COMFORT LEVEL, ACCEPTABLE
Shift Summary: Pt appeared to sleep 7 hrs over NOC shift without issue, continues on 15 min checks  Quality of Sleep: WDL   0

## 2023-05-17 NOTE — PRE-OP CHECKLIST - TO WHOM
Pt has not missed doses, but has lost significant amount of weight since last seen (> 50 lb).  Instructed patient to increase Tac dose to 1 mg BID.  ----- Message from Carmela Arizmendi MD sent at 5/17/2023 11:17 AM CDT -----  -Maritza: did patient miss any tac doses?  If not, start increase tac to 1 mg BID.  Repeat tac/BMP/CBC in 5 days   -Arjun: can you review his meds to see if any drug-drug interaction causing tac metabolism.  
Jaycee

## 2023-06-13 PROBLEM — I10 ESSENTIAL (PRIMARY) HYPERTENSION: Chronic | Status: ACTIVE | Noted: 2018-02-12

## 2023-06-27 ENCOUNTER — APPOINTMENT (OUTPATIENT)
Dept: SURGERY | Facility: CLINIC | Age: 73
End: 2023-06-27
Payer: MEDICARE

## 2023-06-27 VITALS
TEMPERATURE: 97.2 F | HEART RATE: 49 BPM | OXYGEN SATURATION: 99 % | DIASTOLIC BLOOD PRESSURE: 83 MMHG | HEIGHT: 66 IN | SYSTOLIC BLOOD PRESSURE: 147 MMHG | RESPIRATION RATE: 16 BRPM | WEIGHT: 186 LBS | BODY MASS INDEX: 29.89 KG/M2

## 2023-06-27 PROCEDURE — 99204 OFFICE O/P NEW MOD 45 MIN: CPT

## 2023-06-27 RX ORDER — TAMSULOSIN HYDROCHLORIDE 0.4 MG/1
0.4 CAPSULE ORAL
Refills: 0 | Status: ACTIVE | COMMUNITY

## 2023-06-27 NOTE — REASON FOR VISIT
[Consultation] : a consultation visit [FreeTextEntry1] : Consultation requested by: Dr. Natacha Bauer

## 2023-06-27 NOTE — ASSESSMENT
[FreeTextEntry1] : Mr. Marcum is a 73-year-old man with a likely left inguinal hernia.  However, the left side of the scrotum is significantly enlarged and therefore we will perform an ultrasound of the groin and testicles to evaluate further.  If the ultrasound is non-diagnostic, we will proceed with a CT abdomen/pelvis.

## 2023-06-27 NOTE — PHYSICAL EXAM
[Calm] : calm [de-identified] : NAD, comfortable [de-identified] : NCAT, no scleral icterus [de-identified] : +BS soft NT ND.  No hepatosplenomegaly. [de-identified] : Bilateral groin examination demonstrates a likely large left inguinal hernia.  The left side of the scrotum is enlarged, with likely more complex contents than a simple inguinoscrotal hernia. [de-identified] : No clubbing, cyanosis, or edema. [de-identified] : Warm, dry. [de-identified] : A&Ox3

## 2023-06-27 NOTE — CONSULT LETTER
[FreeTextEntry1] : 2023\par \par \par \par \par Natacha Bauer D.O.\par [facility]\par [address]\[place]  [address]\par Telephone #: [phone]\par \par \par Re: Trixie Coto\par : 1950\par \par \par Dear Dr. Bauer:\par \par I had the opportunity to see Mr. Marcum today for evaluation and management of a left inguinal hernia.  He stated the hernia has been present for approximately 6 months.  He first noticed the hernia as a bulge in the left groin.  He denied significant pain or discomfort of the hernia.  He stated the hernia is enlarging over the last 2 weeks.  He was having diarrhea because he had been taking a lot of laxatives to avoid constipation so he would not have to strain to have a bowel movement.  He noted urinary frequency, for which he is taking tamsulosin, which has not changed since he noticed the hernia.\par \par On physical examination, his height is 5 feet 6 inches, his weight is 186 pounds, and BMI is 30.02. His temperature is 97.2 °F, blood pressure is 147/83, heart rate is 49, and O2 saturation is 99% on room air. In general, he is a well-dressed, well-nourished man who appears his stated age and is in no acute distress. He is calm, alert and oriented x 3. HEENT exam demonstrates no scleral icterus and a normocephalic atraumatic appearance. His abdomen is soft, non-tender, and non-distended.  His extremities are warm and dry with no evidence of clubbing, cyanosis, or edema. Bilateral groin examination demonstrates a likely large left inguinal hernia.  The left side of the scrotum is enlarged, with likely more complex contents than a simple inguinoscrotal hernia.\par \par In summary, Mr. Marcum is a 73-year-old man with a likely left inguinal hernia.  However, the left side of the scrotum is significantly enlarged and therefore we will perform an ultrasound of the groin and testicles to evaluate further.  If the ultrasound is non-diagnostic, we will proceed with a CT abdomen/pelvis.\par \par Thank you for the opportunity to care for this patient. Please do not hesitate to contact me in the event that you have any questions or concerns about the care of this patient.\par \par Sincerely,\par \par \par \par \par Selam Kee M.D.\par \par CC:\par Aakash Astorga M.D.\par [facility]\par [address], [address]\[place] [address]\par Telephone #: [phone]

## 2023-06-27 NOTE — HISTORY OF PRESENT ILLNESS
[de-identified] : Mr. Marcum presented today for evaluation and management of a left inguinal hernia.  He stated the hernia has been present for approximately 6 months.  He first noticed the hernia as a bulge in the left groin.  He denied significant pain or discomfort of the hernia.  He stated the hernia is enlarging over the last 2 weeks.  He was having diarrhea because he had been taking a lot of laxatives to avoid constipation so he would not have to strain to have a bowel movement.  He noted urinary frequency, for which he is taking tamsulosin, which has not changed since he noticed the hernia.

## 2023-06-29 ENCOUNTER — OUTPATIENT (OUTPATIENT)
Dept: OUTPATIENT SERVICES | Facility: HOSPITAL | Age: 73
LOS: 1 days | End: 2023-06-29

## 2023-06-29 ENCOUNTER — APPOINTMENT (OUTPATIENT)
Dept: ULTRASOUND IMAGING | Facility: CLINIC | Age: 73
End: 2023-06-29
Payer: MEDICARE

## 2023-06-29 ENCOUNTER — RESULT REVIEW (OUTPATIENT)
Age: 73
End: 2023-06-29

## 2023-06-29 PROCEDURE — 76870 US EXAM SCROTUM: CPT | Mod: 26

## 2023-06-29 PROCEDURE — 76857 US EXAM PELVIC LIMITED: CPT | Mod: 26

## 2023-06-29 PROCEDURE — 93976 VASCULAR STUDY: CPT | Mod: 26,59

## 2023-07-05 ENCOUNTER — RESULT REVIEW (OUTPATIENT)
Age: 73
End: 2023-07-05

## 2023-07-20 ENCOUNTER — OUTPATIENT (OUTPATIENT)
Dept: OUTPATIENT SERVICES | Facility: HOSPITAL | Age: 73
LOS: 1 days | End: 2023-07-20

## 2023-07-20 ENCOUNTER — APPOINTMENT (OUTPATIENT)
Dept: CT IMAGING | Facility: CLINIC | Age: 73
End: 2023-07-20
Payer: MEDICARE

## 2023-07-20 PROCEDURE — 74177 CT ABD & PELVIS W/CONTRAST: CPT | Mod: 26

## 2023-08-23 DIAGNOSIS — Z01.818 ENCOUNTER FOR OTHER PREPROCEDURAL EXAMINATION: ICD-10-CM

## 2023-08-24 ENCOUNTER — OUTPATIENT (OUTPATIENT)
Dept: OUTPATIENT SERVICES | Facility: HOSPITAL | Age: 73
LOS: 1 days | End: 2023-08-24
Payer: MEDICARE

## 2023-08-24 ENCOUNTER — APPOINTMENT (OUTPATIENT)
Dept: RADIOLOGY | Facility: CLINIC | Age: 73
End: 2023-08-24

## 2023-08-24 PROCEDURE — 71046 X-RAY EXAM CHEST 2 VIEWS: CPT | Mod: 26

## 2023-09-18 ENCOUNTER — TRANSCRIPTION ENCOUNTER (OUTPATIENT)
Age: 73
End: 2023-09-18

## 2023-09-18 RX ORDER — OXYCODONE HYDROCHLORIDE 5 MG/1
5 TABLET ORAL ONCE
Refills: 0 | Status: DISCONTINUED | OUTPATIENT
Start: 2023-09-19 | End: 2023-09-19

## 2023-09-18 RX ORDER — FENTANYL CITRATE 50 UG/ML
25 INJECTION INTRAVENOUS
Refills: 0 | Status: DISCONTINUED | OUTPATIENT
Start: 2023-09-19 | End: 2023-09-19

## 2023-09-18 RX ORDER — CHLORHEXIDINE GLUCONATE 213 G/1000ML
1 SOLUTION TOPICAL DAILY
Refills: 0 | Status: DISCONTINUED | OUTPATIENT
Start: 2023-09-19 | End: 2023-09-19

## 2023-09-18 RX ORDER — SODIUM CHLORIDE 9 MG/ML
1000 INJECTION, SOLUTION INTRAVENOUS
Refills: 0 | Status: DISCONTINUED | OUTPATIENT
Start: 2023-09-19 | End: 2023-09-19

## 2023-09-18 NOTE — ASU PATIENT PROFILE, ADULT - NSICDXPASTMEDICALHX_GEN_ALL_CORE_FT
PAST MEDICAL HISTORY:  Atrial flutter     Gastric ulcer X4    GI bleeding     Glaucoma     H/O degenerative disc disease     H/O ETOH abuse sober X6 mos    H/O: duodenal ulcer     H/O: glaucoma     HTN (hypertension)     Osteoarthritis     Paroxysmal atrial fibrillation     Prediabetes     PVD (peripheral vascular disease)

## 2023-09-18 NOTE — ASU PATIENT PROFILE, ADULT - NSICDXPASTSURGICALHX_GEN_ALL_CORE_FT
PAST SURGICAL HISTORY:  H/O cataract extraction     H/O inguinal hernia repair     History of cardioversion     History of throat surgery for abcess    Presence of Watchman left atrial appendage closure device     Status post ORIF of fracture of ankle

## 2023-09-18 NOTE — ASU PATIENT PROFILE, ADULT - NS PREOP UNDERSTANDS INFO
No solid food/dairy/candy/gum after midnight; water allowed before 07:30am tomorrow; patient to come with photo ID/insurance/credit card; dress in comfortable clothes; no jewelries/contact lens/valuables; no smoking/alcohol consumption/recreational drug use today; escort must have a photo ID; address and call back number was given,/yes

## 2023-09-18 NOTE — ASU PATIENT PROFILE, ADULT - FALL HARM RISK - RISK INTERVENTIONS

## 2023-09-18 NOTE — PRE-ANESTHESIA EVALUATION ADULT - NSANTHOSAYNRD_GEN_A_CORE
No. WALKER screening performed.  STOP BANG Legend: 0-2 = LOW Risk; 3-4 = INTERMEDIATE Risk; 5-8 = HIGH Risk

## 2023-09-19 ENCOUNTER — TRANSCRIPTION ENCOUNTER (OUTPATIENT)
Age: 73
End: 2023-09-19

## 2023-09-19 ENCOUNTER — OUTPATIENT (OUTPATIENT)
Dept: OUTPATIENT SERVICES | Facility: HOSPITAL | Age: 73
LOS: 1 days | Discharge: HOME | End: 2023-09-19
Payer: MEDICARE

## 2023-09-19 ENCOUNTER — APPOINTMENT (OUTPATIENT)
Dept: SURGERY | Facility: AMBULATORY SURGERY CENTER | Age: 73
End: 2023-09-19

## 2023-09-19 VITALS
SYSTOLIC BLOOD PRESSURE: 138 MMHG | HEART RATE: 51 BPM | TEMPERATURE: 98 F | OXYGEN SATURATION: 99 % | RESPIRATION RATE: 16 BRPM | WEIGHT: 190.7 LBS | HEIGHT: 65 IN | DIASTOLIC BLOOD PRESSURE: 68 MMHG

## 2023-09-19 VITALS
SYSTOLIC BLOOD PRESSURE: 150 MMHG | HEART RATE: 48 BPM | RESPIRATION RATE: 16 BRPM | OXYGEN SATURATION: 98 % | DIASTOLIC BLOOD PRESSURE: 69 MMHG | TEMPERATURE: 98 F

## 2023-09-19 DIAGNOSIS — Z98.49 CATARACT EXTRACTION STATUS, UNSPECIFIED EYE: Chronic | ICD-10-CM

## 2023-09-19 DIAGNOSIS — Z98.890 OTHER SPECIFIED POSTPROCEDURAL STATES: Chronic | ICD-10-CM

## 2023-09-19 DIAGNOSIS — Z95.818 PRESENCE OF OTHER CARDIAC IMPLANTS AND GRAFTS: Chronic | ICD-10-CM

## 2023-09-19 DIAGNOSIS — Z92.89 PERSONAL HISTORY OF OTHER MEDICAL TREATMENT: Chronic | ICD-10-CM

## 2023-09-19 PROCEDURE — 49591 RPR AA HRN 1ST < 3 CM RDC: CPT | Mod: AS

## 2023-09-19 PROCEDURE — S2900 ROBOTIC SURGICAL SYSTEM: CPT | Mod: NC

## 2023-09-19 PROCEDURE — 55520 REMOVAL OF SPERM CORD LESION: CPT | Mod: AS,LT,59

## 2023-09-19 PROCEDURE — 49650 LAP ING HERNIA REPAIR INIT: CPT | Mod: AS,LT

## 2023-09-19 PROCEDURE — 49650 LAP ING HERNIA REPAIR INIT: CPT | Mod: LT

## 2023-09-19 PROCEDURE — 49591 RPR AA HRN 1ST < 3 CM RDC: CPT

## 2023-09-19 DEVICE — MESH HERNIA INGUINAL 3DMAX EXTRA LARGE 5 X 7" LEFT: Type: IMPLANTABLE DEVICE | Site: LEFT | Status: FUNCTIONAL

## 2023-09-19 RX ORDER — OXYCODONE HYDROCHLORIDE 5 MG/1
1 TABLET ORAL
Qty: 10 | Refills: 0
Start: 2023-09-19

## 2023-09-19 RX ORDER — ASPIRIN/CALCIUM CARB/MAGNESIUM 324 MG
1 TABLET ORAL
Refills: 0 | DISCHARGE

## 2023-09-19 RX ORDER — ACETAMINOPHEN 500 MG
1000 TABLET ORAL ONCE
Refills: 0 | Status: COMPLETED | OUTPATIENT
Start: 2023-09-19 | End: 2023-09-19

## 2023-09-19 RX ORDER — TAMSULOSIN HYDROCHLORIDE 0.4 MG/1
1 CAPSULE ORAL
Refills: 0 | DISCHARGE

## 2023-09-19 RX ORDER — ATORVASTATIN CALCIUM 80 MG/1
1 TABLET, FILM COATED ORAL
Refills: 0 | DISCHARGE

## 2023-09-19 RX ORDER — ACETAMINOPHEN 500 MG
1000 TABLET ORAL ONCE
Refills: 0 | Status: DISCONTINUED | OUTPATIENT
Start: 2023-09-19 | End: 2023-09-19

## 2023-09-19 RX ORDER — TAMSULOSIN HYDROCHLORIDE 0.4 MG/1
0.4 CAPSULE ORAL ONCE
Refills: 0 | Status: COMPLETED | OUTPATIENT
Start: 2023-09-19 | End: 2023-09-19

## 2023-09-19 RX ORDER — DOCUSATE SODIUM 100 MG
1 CAPSULE ORAL
Qty: 20 | Refills: 0
Start: 2023-09-19

## 2023-09-19 RX ORDER — LISINOPRIL 2.5 MG/1
1 TABLET ORAL
Refills: 0 | DISCHARGE

## 2023-09-19 RX ADMIN — TAMSULOSIN HYDROCHLORIDE 0.4 MILLIGRAM(S): 0.4 CAPSULE ORAL at 15:18

## 2023-09-19 RX ADMIN — SODIUM CHLORIDE 100 MILLILITER(S): 9 INJECTION, SOLUTION INTRAVENOUS at 14:16

## 2023-09-19 RX ADMIN — CHLORHEXIDINE GLUCONATE 1 APPLICATION(S): 213 SOLUTION TOPICAL at 10:09

## 2023-09-19 RX ADMIN — Medication 1000 MILLIGRAM(S): at 10:00

## 2023-09-19 NOTE — BRIEF OPERATIVE NOTE - NSICDXBRIEFPOSTOP_GEN_ALL_CORE_FT
POST-OP DIAGNOSIS:  Left inguinal hernia 19-Sep-2023 13:49:07 scrotal Tatyana Vega  Umbilical hernia 19-Sep-2023 13:49:15  Tatyana Vega

## 2023-09-19 NOTE — ASU DISCHARGE PLAN (ADULT/PEDIATRIC) - CARE PROVIDER_API CALL
Selam Kee Bonilla  Surgery  [address] [place] [address], [address]  [place] [address]  Phone: [phone]  Fax: [phone]  Follow Up Time: 2 weeks

## 2023-09-19 NOTE — ASU DISCHARGE PLAN (ADULT/PEDIATRIC) - ASU DC SPECIAL INSTRUCTIONSFT
For pain, you may 650mg of Tylenol every 6 hours as needed. Please do not exceed 4000mg of Tylenol in 24 hours.  For pain that is not resolved with over the counter medication, you may take 1 oxycodone 5mg tablet every 8 hours as needed.  Please take 2 tablets of colace each night if you are taking opioid medication for pain.    You may resume taking your Aspirin tomorrow.     It is normal for you to experience swelling and bruising on and around your scrotum. Please keep scrotal support on for as long as possible each day.     General Discharge Instructions:  Please resume all regular home medications unless specifically advised not to take a particular medication. Please take any new medications as prescribed.  Please get plenty of rest, continue to ambulate several times per day, and drink adequate amounts of fluids. Avoid lifting weights greater than 5-10 lbs until you follow-up with your surgeon, who will instruct you further regarding activity restrictions.  Avoid driving or operating heavy machinery while taking pain medications.  Please follow-up with your surgeon and Primary Care Provider (PCP) as advised.    Warning Signs:  Please call your doctor or nurse practitioner if you experience the following:  *You experience new chest pain, pressure, squeezing or tightness.  *New or worsening cough, shortness of breath, or wheeze.  *If you are vomiting and cannot keep down fluids or your medications.  *You are getting dehydrated due to continued vomiting, diarrhea, or other reasons. Signs of dehydration include dry mouth, rapid heartbeat, or feeling dizzy or faint when standing.  *You see blood or dark/black material when you vomit or have a bowel movement.  *You experience burning when you urinate, have blood in your urine, or experience a discharge.  *Your pain is not improving within 8-12 hours or is not gone within 24 hours. Call or return immediately if your pain is getting worse, changes location, or moves to your chest or back.  *You have shaking chills, or fever greater than 101.5 degrees Fahrenheit or 38 degrees Celsius.  *Any change in your symptoms, or any new symptoms that concern you.

## 2023-09-19 NOTE — ASU DISCHARGE PLAN (ADULT/PEDIATRIC) - DO NOT SUBMERGE DURATION DAY(S)
Do not submerge incisions in water, No sponges directly to incision. Just allow water to rinse them off.

## 2023-09-19 NOTE — BRIEF OPERATIVE NOTE - OPERATION/FINDINGS
Supraumbilical mckeon cutdown and placement of 12mm mckeon port. Two additional ports placed under direct visualization. Large left scrotal hernia identified, no hernia visualized on right side. Robot docked. On left side, peritoneal flap created. Indirect hernia sac carefully dissected from cord structures and reduced into peritoneal cavity. Large mesh placed to cover indirect, direct, and femoral spaces. Hemostasis ensured. Peritoneal flap closed with 0 Quill. Robot undocked. Fascia closed with 0 Maxon. Skin closed with 4-0 Monocryl. Supraumbilical mckeon cutdown and placement of 12mm mckeon port. Two additional ports placed under direct visualization. Large left scrotal hernia identified, no hernia visualized on right side. Robot docked. On left side, peritoneal flap created. Indirect hernia sac carefully dissected from cord structures and reduced into peritoneal cavity. Large mesh placed to cover indirect, direct, and femoral spaces. Hemostasis ensured. Peritoneal flap closed with 0 Quill. Robot undocked. Fascia closed with 0 Maxon. Skin closed with 4-0 Monocryl. No qualified resident was able to assist at bedside throughout robotic portion of the case, PA assisted at bedside throughout robotic portion of the surgery.

## 2023-09-21 PROBLEM — Z87.39 PERSONAL HISTORY OF OTHER DISEASES OF THE MUSCULOSKELETAL SYSTEM AND CONNECTIVE TISSUE: Chronic | Status: ACTIVE | Noted: 2023-09-19

## 2023-09-21 PROBLEM — R73.03 PREDIABETES: Chronic | Status: ACTIVE | Noted: 2023-09-19

## 2023-09-21 PROBLEM — Z86.69 PERSONAL HISTORY OF OTHER DISEASES OF THE NERVOUS SYSTEM AND SENSE ORGANS: Chronic | Status: ACTIVE | Noted: 2023-09-19

## 2023-09-21 PROBLEM — I48.92 UNSPECIFIED ATRIAL FLUTTER: Chronic | Status: ACTIVE | Noted: 2023-09-19

## 2023-09-21 PROBLEM — F10.11 ALCOHOL ABUSE, IN REMISSION: Chronic | Status: ACTIVE | Noted: 2023-09-19

## 2023-09-21 PROBLEM — I73.9 PERIPHERAL VASCULAR DISEASE, UNSPECIFIED: Chronic | Status: ACTIVE | Noted: 2023-09-19

## 2023-09-21 PROBLEM — Z87.19 PERSONAL HISTORY OF OTHER DISEASES OF THE DIGESTIVE SYSTEM: Chronic | Status: ACTIVE | Noted: 2023-09-19

## 2023-09-21 PROBLEM — K92.2 GASTROINTESTINAL HEMORRHAGE, UNSPECIFIED: Chronic | Status: ACTIVE | Noted: 2023-09-19

## 2023-09-21 PROBLEM — M19.90 UNSPECIFIED OSTEOARTHRITIS, UNSPECIFIED SITE: Chronic | Status: ACTIVE | Noted: 2023-09-19

## 2023-09-21 PROBLEM — I48.0 PAROXYSMAL ATRIAL FIBRILLATION: Chronic | Status: ACTIVE | Noted: 2023-09-19

## 2023-09-21 PROBLEM — K25.9 GASTRIC ULCER, UNSPECIFIED AS ACUTE OR CHRONIC, WITHOUT HEMORRHAGE OR PERFORATION: Chronic | Status: ACTIVE | Noted: 2023-09-19

## 2023-09-28 NOTE — ED ADULT TRIAGE NOTE - SOURCE OF INFORMATION
After consent obtained/verified, allergy injection given in back of R/L arm(s). VIAL COLOR OF ALL VIALS TODAY IS red 1:1    ALLERGY INJECTION FROM VIAL A GIVEN right  UPPER ARM IN THE AMOUNT OF 0.50 ML    ALLERGY INJECTION FROM VIAL B GIVEN left upper ARM IN THE AMOUNT OF 0.50  ML    ALLERGY INJECTION FROM VIAL C GIVEN leftlower ARM IN THE AMOUNT OF 0.50 ML      Documentation of vial injection specific to arm(s) noted on Allergy Immunotherapy Administration Form. Patient waited 30 minutes for observation. No      Patient tolerated well without adverse reaction WHILE IN OFFICE    SHOT REACTION TREATMENT INSTRUCTIONS    During the 30 minute wait after an allergy injection the following symptoms should be reported:    Itching other than at the injection site  Hives or swelling other than at the injection site  Redness other than at the injection site  Difficulty breathing  Chest tightness  Difficulty swallowing  Throat tightness    If these symptoms occur, NOTIFY PROVIDER and the following treatment should be administered:    1. Epinephrine/Auvi Q 1:1000 IM - 0.3 ml if > 66 lbs or more, 0.15 ml if 33 - 63 lbs, or 0.1 ml if <33 lbs     2. Diphenhydramine - give all intramuscular:     2 to <6 years (off-label use): 6.25 mg,    6 to <12 years: 12.5 to 25 mg;    ?12 years: 25-50 mg.    3.  Famotidine:  Adults 40 mg oral    Adolescents age 12 years and >88 lbs: 40 mg    Children and Adolescents ? 12years of age: Initial: 0.25 mg/kg/dose  every 12 hours (maximum daily dose: 40 mg/day)    Epi/Auvi Q dose may me repeated in 5-15 minutes if adequate resolution of symptoms does not occur    Patient should be observed for at least one hour after final Epi/Auvi Q dose and must be seen by provider. Patients cannot drive themselves if they have received diphenhydramine. Patient

## 2023-10-02 ENCOUNTER — APPOINTMENT (OUTPATIENT)
Dept: SURGERY | Facility: CLINIC | Age: 73
End: 2023-10-02
Payer: MEDICARE

## 2023-10-02 VITALS
HEART RATE: 61 BPM | TEMPERATURE: 98.2 F | WEIGHT: 195 LBS | DIASTOLIC BLOOD PRESSURE: 60 MMHG | HEIGHT: 66 IN | SYSTOLIC BLOOD PRESSURE: 101 MMHG | OXYGEN SATURATION: 98 % | BODY MASS INDEX: 31.34 KG/M2

## 2023-10-02 DIAGNOSIS — Z82.49 FAMILY HISTORY OF ISCHEMIC HEART DISEASE AND OTHER DISEASES OF THE CIRCULATORY SYSTEM: ICD-10-CM

## 2023-10-02 DIAGNOSIS — K42.9 UMBILICAL HERNIA W/OUT OBSTRUCTION OR GANGRENE: ICD-10-CM

## 2023-10-02 DIAGNOSIS — N13.8 BENIGN PROSTATIC HYPERPLASIA WITH LOWER URINARY TRACT SYMPMS: ICD-10-CM

## 2023-10-02 DIAGNOSIS — Z80.3 FAMILY HISTORY OF MALIGNANT NEOPLASM OF BREAST: ICD-10-CM

## 2023-10-02 DIAGNOSIS — N40.1 BENIGN PROSTATIC HYPERPLASIA WITH LOWER URINARY TRACT SYMPMS: ICD-10-CM

## 2023-10-02 DIAGNOSIS — F17.200 NICOTINE DEPENDENCE, UNSPECIFIED, UNCOMPLICATED: ICD-10-CM

## 2023-10-02 DIAGNOSIS — N50.89 OTHER SPECIFIED DISORDERS OF THE MALE GENITAL ORGANS: ICD-10-CM

## 2023-10-02 DIAGNOSIS — K40.90 UNILATERAL INGUINAL HERNIA, W/OUT OBSTRUCTION OR GANGRENE, NOT SPECIFIED AS RECURRENT: ICD-10-CM

## 2023-10-02 PROCEDURE — 99024 POSTOP FOLLOW-UP VISIT: CPT

## 2023-10-06 NOTE — PRE-OP CHECKLIST - WAS PATIENT ON BETA BLOCKER?
----- Message from Saagr Ruano MD sent at 10/4/2023  6:57 AM CDT -----  Please notify pt of results  Can we set up repeat Quantiferon testing in a week or so?   No

## (undated) DEVICE — SUT MONOCRYL 4-0 27" PS-2 UNDYED

## (undated) DEVICE — SUT VICRYL 2-0 27" SH

## (undated) DEVICE — DRSG SUPPORTER ADULT 3" WAISTBAND LRG

## (undated) DEVICE — DRAPE TOP SHEET 53" X 101"

## (undated) DEVICE — NDL LABORIE INJETAK ADJUSTABLE TIP 35CM

## (undated) DEVICE — XI ARM FORCEP CADIERE 8MM

## (undated) DEVICE — MARKING PEN W RULER

## (undated) DEVICE — XI OBTURATOR OPTICAL BLADELESS 8MM

## (undated) DEVICE — ELCTR BOVIE TIP BLADE INSULATED 2.75" EDGE

## (undated) DEVICE — ELCTR GROUNDING PAD ADULT COVIDIEN

## (undated) DEVICE — XI DRAPE COLUMN

## (undated) DEVICE — GLV 7 PROTEXIS (WHITE)

## (undated) DEVICE — WARMING BLANKET LOWER ADULT

## (undated) DEVICE — TROCAR COVIDIEN VERSAONE BLUNT TIP HASSAN 12MM

## (undated) DEVICE — GLV 6.5 PROTEXIS (WHITE)

## (undated) DEVICE — SLV COMPRESSION KNEE MED

## (undated) DEVICE — SUT VICRYL 0 27" UR-6

## (undated) DEVICE — ELCTR BOVIE PENCIL HANDPIECE ROCKER SWITCH 15FT

## (undated) DEVICE — GOWN ROYAL SILK XL

## (undated) DEVICE — D HELP - CLEARVIEW CLEARIFY SYSTEM

## (undated) DEVICE — DRSG MASTISOL

## (undated) DEVICE — SUT MAXON 0 30" GS-11

## (undated) DEVICE — XI SEAL UNIV 5- 8 MM

## (undated) DEVICE — XI DRAPE ARM

## (undated) DEVICE — SUT PDO 0 1/2 CIRCLE 22MM NDL 20CM

## (undated) DEVICE — DRAPE MAYO STAND 23"